# Patient Record
Sex: FEMALE | Race: WHITE | NOT HISPANIC OR LATINO | URBAN - METROPOLITAN AREA
[De-identification: names, ages, dates, MRNs, and addresses within clinical notes are randomized per-mention and may not be internally consistent; named-entity substitution may affect disease eponyms.]

---

## 2017-03-13 ENCOUNTER — EMERGENCY (EMERGENCY)
Facility: HOSPITAL | Age: 72
LOS: 1 days | End: 2017-03-13
Admitting: EMERGENCY MEDICINE
Payer: MEDICARE

## 2017-03-13 DIAGNOSIS — Z98.89 OTHER SPECIFIED POSTPROCEDURAL STATES: Chronic | ICD-10-CM

## 2017-03-13 PROCEDURE — 71100 X-RAY EXAM RIBS UNI 2 VIEWS: CPT

## 2017-03-13 PROCEDURE — 72170 X-RAY EXAM OF PELVIS: CPT

## 2017-03-13 PROCEDURE — 99283 EMERGENCY DEPT VISIT LOW MDM: CPT

## 2017-03-13 PROCEDURE — 99284 EMERGENCY DEPT VISIT MOD MDM: CPT | Mod: 25

## 2017-03-13 PROCEDURE — 71020: CPT | Mod: 26

## 2017-03-13 PROCEDURE — 71100 X-RAY EXAM RIBS UNI 2 VIEWS: CPT | Mod: 26

## 2017-03-13 PROCEDURE — 72170 X-RAY EXAM OF PELVIS: CPT | Mod: 26

## 2017-03-13 PROCEDURE — 71046 X-RAY EXAM CHEST 2 VIEWS: CPT

## 2017-03-27 ENCOUNTER — APPOINTMENT (OUTPATIENT)
Dept: INTERNAL MEDICINE | Facility: CLINIC | Age: 72
End: 2017-03-27

## 2017-03-27 VITALS
SYSTOLIC BLOOD PRESSURE: 110 MMHG | TEMPERATURE: 98 F | BODY MASS INDEX: 24.48 KG/M2 | RESPIRATION RATE: 13 BRPM | HEIGHT: 62 IN | WEIGHT: 133 LBS | HEART RATE: 72 BPM | OXYGEN SATURATION: 98 % | DIASTOLIC BLOOD PRESSURE: 60 MMHG

## 2017-03-27 DIAGNOSIS — R07.81 PLEURODYNIA: ICD-10-CM

## 2017-04-24 ENCOUNTER — APPOINTMENT (OUTPATIENT)
Dept: INTERNAL MEDICINE | Facility: CLINIC | Age: 72
End: 2017-04-24

## 2017-04-24 VITALS
DIASTOLIC BLOOD PRESSURE: 58 MMHG | HEART RATE: 75 BPM | SYSTOLIC BLOOD PRESSURE: 98 MMHG | WEIGHT: 133 LBS | HEIGHT: 62 IN | RESPIRATION RATE: 13 BRPM | OXYGEN SATURATION: 98 % | BODY MASS INDEX: 24.48 KG/M2

## 2017-04-24 DIAGNOSIS — M54.9 DORSALGIA, UNSPECIFIED: ICD-10-CM

## 2017-05-08 ENCOUNTER — APPOINTMENT (OUTPATIENT)
Dept: ORTHOPEDIC SURGERY | Facility: CLINIC | Age: 72
End: 2017-05-08

## 2017-05-08 VITALS
DIASTOLIC BLOOD PRESSURE: 73 MMHG | SYSTOLIC BLOOD PRESSURE: 125 MMHG | BODY MASS INDEX: 24.48 KG/M2 | WEIGHT: 133 LBS | HEIGHT: 62 IN

## 2017-05-08 DIAGNOSIS — M41.9 SCOLIOSIS, UNSPECIFIED: ICD-10-CM

## 2017-05-08 DIAGNOSIS — M26.629 ARTHRALGIA OF TEMPOROMANDIBULAR JOINT,: ICD-10-CM

## 2017-05-08 DIAGNOSIS — M85.80 OTHER SPECIFIED DISORDERS OF BONE DENSITY AND STRUCTURE, UNSPECIFIED SITE: ICD-10-CM

## 2017-05-08 DIAGNOSIS — E55.9 VITAMIN D DEFICIENCY, UNSPECIFIED: ICD-10-CM

## 2017-06-12 ENCOUNTER — APPOINTMENT (OUTPATIENT)
Dept: ORTHOPEDIC SURGERY | Facility: CLINIC | Age: 72
End: 2017-06-12

## 2018-07-03 ENCOUNTER — APPOINTMENT (OUTPATIENT)
Dept: INTERNAL MEDICINE | Facility: CLINIC | Age: 73
End: 2018-07-03
Payer: MEDICARE

## 2018-07-03 VITALS
HEIGHT: 62 IN | TEMPERATURE: 98.1 F | DIASTOLIC BLOOD PRESSURE: 68 MMHG | OXYGEN SATURATION: 97 % | SYSTOLIC BLOOD PRESSURE: 122 MMHG | HEART RATE: 82 BPM | RESPIRATION RATE: 14 BRPM | WEIGHT: 131 LBS | BODY MASS INDEX: 24.11 KG/M2

## 2018-07-03 DIAGNOSIS — N64.4 MASTODYNIA: ICD-10-CM

## 2018-07-03 PROCEDURE — 99214 OFFICE O/P EST MOD 30 MIN: CPT

## 2018-07-03 NOTE — HISTORY OF PRESENT ILLNESS
[FreeTextEntry8] : pt here today with right breast and chest wall tender nous.\par Pt tendentious in the chest wall muscle.\par Last mammogram 11/17\par No SOB,\par No Palpitations.

## 2018-07-03 NOTE — ASSESSMENT
[FreeTextEntry1] : Most likely costochondritis\par Naprosyn 500mg bid with food.\par Return if worse.

## 2018-07-03 NOTE — PHYSICAL EXAM

## 2018-12-11 ENCOUNTER — TRANSCRIPTION ENCOUNTER (OUTPATIENT)
Age: 73
End: 2018-12-11

## 2018-12-12 ENCOUNTER — OUTPATIENT (OUTPATIENT)
Dept: OUTPATIENT SERVICES | Facility: HOSPITAL | Age: 73
LOS: 1 days | End: 2018-12-12
Payer: MEDICARE

## 2018-12-12 VITALS
SYSTOLIC BLOOD PRESSURE: 109 MMHG | RESPIRATION RATE: 13 BRPM | OXYGEN SATURATION: 97 % | HEART RATE: 67 BPM | DIASTOLIC BLOOD PRESSURE: 59 MMHG

## 2018-12-12 VITALS
RESPIRATION RATE: 15 BRPM | DIASTOLIC BLOOD PRESSURE: 59 MMHG | TEMPERATURE: 99 F | WEIGHT: 133.6 LBS | HEART RATE: 60 BPM | OXYGEN SATURATION: 96 % | SYSTOLIC BLOOD PRESSURE: 109 MMHG | HEIGHT: 61.25 IN

## 2018-12-12 DIAGNOSIS — Z98.89 OTHER SPECIFIED POSTPROCEDURAL STATES: Chronic | ICD-10-CM

## 2018-12-12 DIAGNOSIS — Z98.41 CATARACT EXTRACTION STATUS, RIGHT EYE: Chronic | ICD-10-CM

## 2018-12-12 DIAGNOSIS — H25.12 AGE-RELATED NUCLEAR CATARACT, LEFT EYE: ICD-10-CM

## 2018-12-12 PROCEDURE — V2632: CPT

## 2018-12-12 PROCEDURE — 66984 XCAPSL CTRC RMVL W/O ECP: CPT | Mod: LT

## 2018-12-12 PROCEDURE — 65820 GONIOTOMY: CPT | Mod: LT

## 2018-12-12 NOTE — ASU PATIENT PROFILE, ADULT - PSH
H/O colonoscopy    History of Laminectomy H/O colonoscopy    History of Laminectomy    S/P cataract surgery, right

## 2018-12-12 NOTE — ASU DISCHARGE PLAN (ADULT/PEDIATRIC). - NOTIFY
Persistent Nausea and Vomiting/Swelling that continues/Pain not relieved by Medications/Bleeding that does not stop/Fever greater than 101

## 2019-06-30 ENCOUNTER — EMERGENCY (EMERGENCY)
Facility: HOSPITAL | Age: 74
LOS: 1 days | Discharge: HOME | End: 2019-06-30
Attending: EMERGENCY MEDICINE | Admitting: EMERGENCY MEDICINE
Payer: MEDICARE

## 2019-06-30 VITALS
HEART RATE: 69 BPM | DIASTOLIC BLOOD PRESSURE: 58 MMHG | TEMPERATURE: 98 F | RESPIRATION RATE: 16 BRPM | SYSTOLIC BLOOD PRESSURE: 129 MMHG | HEIGHT: 60 IN | WEIGHT: 130.07 LBS | OXYGEN SATURATION: 97 %

## 2019-06-30 DIAGNOSIS — Z98.89 OTHER SPECIFIED POSTPROCEDURAL STATES: Chronic | ICD-10-CM

## 2019-06-30 DIAGNOSIS — Z98.41 CATARACT EXTRACTION STATUS, RIGHT EYE: Chronic | ICD-10-CM

## 2019-06-30 PROCEDURE — 99282 EMERGENCY DEPT VISIT SF MDM: CPT

## 2019-06-30 NOTE — ED PROVIDER NOTE - OBJECTIVE STATEMENT
74 yo white female had surgery for removal of right thumb cyst few weeks ago, sutures were removed few days ago and patient now presents for some blood was noted to come from surgical site after showering this morning. No fever or chills.

## 2019-06-30 NOTE — ED PROVIDER NOTE - NSFOLLOWUPINSTRUCTIONS_ED_ALL_ED_FT
Keep wound clean and dry  Wear splint  Follow-up with your hand doctor in the morning  Return here if needed

## 2019-06-30 NOTE — ED PROVIDER NOTE - CARE PROVIDER_API CALL
Rudy Nelson (MD)  Orthopaedic Surgery  [address] [place] [address]  Phone: [phone]  Fax: [phone]  Follow Up Time:

## 2019-07-08 NOTE — ASU PREOP CHECKLIST - WEIGHT IN KG
Component      Latest Ref Rng & Units 6/19/2019 6/21/2019   Fasting Status      hrs 15.5    Sodium      135 - 145 mmol/L 143    Potassium      3.4 - 5.1 mmol/L 4.3    Chloride      98 - 107 mmol/L 110 (H)    CO2      21 - 32 mmol/L 27    ANION GAP      10 - 20 mmol/L 10    Glucose      65 - 99 mg/dL 101 (H)    BUN      6 - 20 mg/dL 19    Creatinine      0.51 - 0.95 mg/dL 0.73    GFR Estimate,        >90    GFR Estimate, Non        85    BUN/CREATININE RATIO      7 - 25 26 (H)    CALCIUM      8.4 - 10.2 mg/dL 9.3    TOTAL BILIRUBIN      0.2 - 1.0 mg/dL 0.9    AST/SGOT      <38 Units/L 34    ALT/SGPT      <64 Units/L 34    ALK PHOSPHATASE      45 - 117 Units/L 78    TOTAL PROTEIN      6.4 - 8.2 g/dL 8.0    Albumin      3.6 - 5.1 g/dL 4.2    GLOBULIN      2.0 - 4.0 g/dL 3.8    A/G Ratio, Serum      1.0 - 2.4 1.1    Iodide        137 (H)     Writer will review message and labs with .    60.6

## 2020-09-09 ENCOUNTER — NON-APPOINTMENT (OUTPATIENT)
Age: 75
End: 2020-09-09

## 2020-09-09 ENCOUNTER — OUTPATIENT (OUTPATIENT)
Dept: OUTPATIENT SERVICES | Facility: HOSPITAL | Age: 75
LOS: 1 days | End: 2020-09-09
Payer: MEDICARE

## 2020-09-09 VITALS
DIASTOLIC BLOOD PRESSURE: 60 MMHG | HEIGHT: 60 IN | TEMPERATURE: 98 F | OXYGEN SATURATION: 97 % | WEIGHT: 136.03 LBS | RESPIRATION RATE: 16 BRPM | SYSTOLIC BLOOD PRESSURE: 124 MMHG | HEART RATE: 63 BPM

## 2020-09-09 DIAGNOSIS — Z98.41 CATARACT EXTRACTION STATUS, RIGHT EYE: Chronic | ICD-10-CM

## 2020-09-09 DIAGNOSIS — S53.32XA TRAUMATIC RUPTURE OF LEFT ULNAR COLLATERAL LIGAMENT, INITIAL ENCOUNTER: ICD-10-CM

## 2020-09-09 DIAGNOSIS — Z98.890 OTHER SPECIFIED POSTPROCEDURAL STATES: Chronic | ICD-10-CM

## 2020-09-09 DIAGNOSIS — Z98.89 OTHER SPECIFIED POSTPROCEDURAL STATES: Chronic | ICD-10-CM

## 2020-09-09 DIAGNOSIS — Z98.49 CATARACT EXTRACTION STATUS, UNSPECIFIED EYE: Chronic | ICD-10-CM

## 2020-09-09 DIAGNOSIS — S53.31XA TRAUMATIC RUPTURE OF RIGHT ULNAR COLLATERAL LIGAMENT, INITIAL ENCOUNTER: ICD-10-CM

## 2020-09-09 DIAGNOSIS — Z01.818 ENCOUNTER FOR OTHER PREPROCEDURAL EXAMINATION: ICD-10-CM

## 2020-09-09 LAB
ANION GAP SERPL CALC-SCNC: 5 MMOL/L — SIGNIFICANT CHANGE UP (ref 5–17)
BUN SERPL-MCNC: 17 MG/DL — SIGNIFICANT CHANGE UP (ref 7–23)
CALCIUM SERPL-MCNC: 8.8 MG/DL — SIGNIFICANT CHANGE UP (ref 8.5–10.1)
CHLORIDE SERPL-SCNC: 108 MMOL/L — SIGNIFICANT CHANGE UP (ref 96–108)
CO2 SERPL-SCNC: 29 MMOL/L — SIGNIFICANT CHANGE UP (ref 22–31)
CREAT SERPL-MCNC: 0.7 MG/DL — SIGNIFICANT CHANGE UP (ref 0.5–1.3)
GLUCOSE SERPL-MCNC: 104 MG/DL — HIGH (ref 70–99)
HCT VFR BLD CALC: 38.9 % — SIGNIFICANT CHANGE UP (ref 34.5–45)
HGB BLD-MCNC: 12.8 G/DL — SIGNIFICANT CHANGE UP (ref 11.5–15.5)
MCHC RBC-ENTMCNC: 28.1 PG — SIGNIFICANT CHANGE UP (ref 27–34)
MCHC RBC-ENTMCNC: 32.9 GM/DL — SIGNIFICANT CHANGE UP (ref 32–36)
MCV RBC AUTO: 85.5 FL — SIGNIFICANT CHANGE UP (ref 80–100)
NRBC # BLD: 0 /100 WBCS — SIGNIFICANT CHANGE UP (ref 0–0)
PLATELET # BLD AUTO: 221 K/UL — SIGNIFICANT CHANGE UP (ref 150–400)
POTASSIUM SERPL-MCNC: 4 MMOL/L — SIGNIFICANT CHANGE UP (ref 3.5–5.3)
POTASSIUM SERPL-SCNC: 4 MMOL/L — SIGNIFICANT CHANGE UP (ref 3.5–5.3)
RBC # BLD: 4.55 M/UL — SIGNIFICANT CHANGE UP (ref 3.8–5.2)
RBC # FLD: 13.1 % — SIGNIFICANT CHANGE UP (ref 10.3–14.5)
SODIUM SERPL-SCNC: 142 MMOL/L — SIGNIFICANT CHANGE UP (ref 135–145)
WBC # BLD: 4.68 K/UL — SIGNIFICANT CHANGE UP (ref 3.8–10.5)
WBC # FLD AUTO: 4.68 K/UL — SIGNIFICANT CHANGE UP (ref 3.8–10.5)

## 2020-09-09 PROCEDURE — 80048 BASIC METABOLIC PNL TOTAL CA: CPT

## 2020-09-09 PROCEDURE — 93005 ELECTROCARDIOGRAM TRACING: CPT

## 2020-09-09 PROCEDURE — 85027 COMPLETE CBC AUTOMATED: CPT

## 2020-09-09 PROCEDURE — 93010 ELECTROCARDIOGRAM REPORT: CPT

## 2020-09-09 PROCEDURE — G0463: CPT

## 2020-09-09 PROCEDURE — 36415 COLL VENOUS BLD VENIPUNCTURE: CPT

## 2020-09-09 NOTE — H&P PST ADULT - NSICDXPROBLEM_GEN_ALL_CORE_FT
PROBLEM DIAGNOSES  Problem: Traumatic rupture of right ulnar collateral ligament  Assessment and Plan:  scheduled for repair  of right thumb ulnar collateral ligament rupture utilizing mini fluro.  Covid testing within 3 days prior to surgery. Information given.

## 2020-09-09 NOTE — H&P PST ADULT - HISTORY OF PRESENT ILLNESS
75 y/o healthy female PMH of glaucoma, denies any other PMH, with c/o of tripping on the sidewalk while walking about 2 weeks ago.  was seen by Dr Nelson, Xray and MRI was done, now scheduled for repair  of right thumb ulnar collateral ligament rupture utilizing mini fluro. Pre op testing today.

## 2020-09-09 NOTE — H&P PST ADULT - ASSESSMENT
scheduled for repair  of right thumb ulnar collateral ligament rupture utilizing mini fluro. Pre op testing today.  Medical eval advised.  Pre op instructions:  Hold OTC supplements. Medications reviewed for the week and morning of surgery.  NPO after 11pm to the morning of surgery.  Shower  the morning of surgery with antibacterial soap as instructed.  Covid testing within 3 days prior to surgery. Information given.  Patient verbalized understanding. scheduled for repair  of right thumb ulnar collateral ligament rupture utilizing mini fluro. Pre op testing today.  Medical eval advised. has no PCP at this time, spoke to sulema in Dr Nelson's office, she said  will recommend a doctor for medical eval.   Pre op instructions:  Hold OTC supplements. Medications reviewed for the week and morning of surgery.  NPO after 11pm to the morning of surgery.  Shower  the morning of surgery with antibacterial soap as instructed.  Covid testing within 3 days prior to surgery. Information given.  Patient verbalized understanding.

## 2020-09-09 NOTE — H&P PST ADULT - NSANTHOSAYNRD_GEN_A_CORE
No. SANDOVAL screening performed.  STOP BANG Legend: 0-2 = LOW Risk; 3-4 = INTERMEDIATE Risk; 5-8 = HIGH Risk

## 2020-09-09 NOTE — H&P PST ADULT - NSICDXPASTSURGICALHX_GEN_ALL_CORE_FT
PAST SURGICAL HISTORY:  H/O colonoscopy     H/O hand surgery left wrist fracture 2018    History of Laminectomy cervical c4    S/P cataract surgery bilateral 2017

## 2020-09-13 ENCOUNTER — OUTPATIENT (OUTPATIENT)
Dept: OUTPATIENT SERVICES | Facility: HOSPITAL | Age: 75
LOS: 1 days | End: 2020-09-13
Payer: MEDICARE

## 2020-09-13 DIAGNOSIS — Z11.59 ENCOUNTER FOR SCREENING FOR OTHER VIRAL DISEASES: ICD-10-CM

## 2020-09-13 DIAGNOSIS — Z98.89 OTHER SPECIFIED POSTPROCEDURAL STATES: Chronic | ICD-10-CM

## 2020-09-13 DIAGNOSIS — Z98.49 CATARACT EXTRACTION STATUS, UNSPECIFIED EYE: Chronic | ICD-10-CM

## 2020-09-13 DIAGNOSIS — Z98.890 OTHER SPECIFIED POSTPROCEDURAL STATES: Chronic | ICD-10-CM

## 2020-09-13 LAB — SARS-COV-2 RNA SPEC QL NAA+PROBE: SIGNIFICANT CHANGE UP

## 2020-09-13 PROCEDURE — U0003: CPT

## 2020-09-14 ENCOUNTER — APPOINTMENT (OUTPATIENT)
Dept: INTERNAL MEDICINE | Facility: CLINIC | Age: 75
End: 2020-09-14
Payer: MEDICARE

## 2020-09-14 ENCOUNTER — TRANSCRIPTION ENCOUNTER (OUTPATIENT)
Age: 75
End: 2020-09-14

## 2020-09-14 VITALS
RESPIRATION RATE: 14 BRPM | SYSTOLIC BLOOD PRESSURE: 130 MMHG | OXYGEN SATURATION: 97 % | DIASTOLIC BLOOD PRESSURE: 64 MMHG | WEIGHT: 144 LBS | BODY MASS INDEX: 26.34 KG/M2 | HEART RATE: 70 BPM | TEMPERATURE: 97.4 F

## 2020-09-14 DIAGNOSIS — S53.31XA TRAUMATIC RUPTURE OF RIGHT ULNAR COLLATERAL LIGAMENT, INITIAL ENCOUNTER: ICD-10-CM

## 2020-09-14 DIAGNOSIS — S63.649A SPRAIN OF METACARPOPHALANGEAL JOINT OF UNSPECIFIED THUMB, INITIAL ENCOUNTER: ICD-10-CM

## 2020-09-14 DIAGNOSIS — Z01.818 ENCOUNTER FOR OTHER PREPROCEDURAL EXAMINATION: ICD-10-CM

## 2020-09-14 PROCEDURE — 99214 OFFICE O/P EST MOD 30 MIN: CPT

## 2020-09-14 RX ORDER — CYCLOBENZAPRINE HYDROCHLORIDE 5 MG/1
5 TABLET, FILM COATED ORAL 3 TIMES DAILY
Qty: 30 | Refills: 0 | Status: DISCONTINUED | COMMUNITY
Start: 2017-03-27 | End: 2020-09-14

## 2020-09-14 NOTE — ASSESSMENT
[Patient Optimized for Surgery] : Patient optimized for surgery [No Further Testing Recommended] : no further testing recommended [FreeTextEntry4] : EKG NSR\par Presurgical labs are within acceptable limits\par There are no medical contraindications to proceeding with the planned surgery.

## 2020-09-14 NOTE — HISTORY OF PRESENT ILLNESS
[No Pertinent Cardiac History] : no history of aortic stenosis, atrial fibrillation, coronary artery disease, recent myocardial infarction, or implantable device/pacemaker [No Pertinent Pulmonary History] : no history of asthma, COPD, sleep apnea, or smoking [No Adverse Anesthesia Reaction] : no adverse anesthesia reaction in self or family member [Chronic Anticoagulation] : no chronic anticoagulation [Diabetes] : no diabetes [Chronic Kidney Disease] : no chronic kidney disease [FreeTextEntry1] : Right wrist surgery [FreeTextEntry2] : 9/15/2020 [FreeTextEntry3] : Dr. Nelson [FreeTextEntry4] : PARKERADELITA CHIDI HUMPHREYS, [birth year], is here for presurgical evaluation.\par  Pt ruptured her left ulnar collateral ligament about 2 weeks ago.\par Pt otherwise feels fine.\par Pt denies chest pain, dyspnea, lightheadedness, palpitations, fever, chills, or sweats

## 2020-09-14 NOTE — ASU PATIENT PROFILE, ADULT - PSH
H/O colonoscopy    H/O hand surgery  left wrist fracture 2018  History of Laminectomy  cervical c4  S/P cataract surgery  bilateral 2017

## 2020-09-14 NOTE — PHYSICAL EXAM
[No Acute Distress] : no acute distress [Well Nourished] : well nourished [Normal Sclera/Conjunctiva] : normal sclera/conjunctiva [Well Developed] : well developed [Well-Appearing] : well-appearing [EOMI] : extraocular movements intact [PERRL] : pupils equal round and reactive to light [No JVD] : no jugular venous distention [Normal Outer Ear/Nose] : the outer ears and nose were normal in appearance [Normal Oropharynx] : the oropharynx was normal [Supple] : supple [No Lymphadenopathy] : no lymphadenopathy [Thyroid Normal, No Nodules] : the thyroid was normal and there were no nodules present [No Respiratory Distress] : no respiratory distress  [Normal Rate] : normal rate  [Clear to Auscultation] : lungs were clear to auscultation bilaterally [No Accessory Muscle Use] : no accessory muscle use [Normal S1, S2] : normal S1 and S2 [Regular Rhythm] : with a regular rhythm [No Murmur] : no murmur heard [No Abdominal Bruit] : a ~M bruit was not heard ~T in the abdomen [No Carotid Bruits] : no carotid bruits [No Varicosities] : no varicosities [Pedal Pulses Present] : the pedal pulses are present [No Edema] : there was no peripheral edema [No Palpable Aorta] : no palpable aorta [No Extremity Clubbing/Cyanosis] : no extremity clubbing/cyanosis [Soft] : abdomen soft [Non-distended] : non-distended [Non Tender] : non-tender [No HSM] : no HSM [No Masses] : no abdominal mass palpated [Normal Posterior Cervical Nodes] : no posterior cervical lymphadenopathy [Normal Bowel Sounds] : normal bowel sounds [No Spinal Tenderness] : no spinal tenderness [Normal Anterior Cervical Nodes] : no anterior cervical lymphadenopathy [No CVA Tenderness] : no CVA  tenderness [No Joint Swelling] : no joint swelling [Grossly Normal Strength/Tone] : grossly normal strength/tone [Coordination Grossly Intact] : coordination grossly intact [No Rash] : no rash [Normal Gait] : normal gait [No Focal Deficits] : no focal deficits [Normal Affect] : the affect was normal [Normal Insight/Judgement] : insight and judgment were intact

## 2020-09-15 ENCOUNTER — OUTPATIENT (OUTPATIENT)
Dept: OUTPATIENT SERVICES | Facility: HOSPITAL | Age: 75
LOS: 1 days | End: 2020-09-15
Payer: MEDICARE

## 2020-09-15 ENCOUNTER — RESULT REVIEW (OUTPATIENT)
Age: 75
End: 2020-09-15

## 2020-09-15 VITALS
OXYGEN SATURATION: 97 % | DIASTOLIC BLOOD PRESSURE: 62 MMHG | SYSTOLIC BLOOD PRESSURE: 146 MMHG | TEMPERATURE: 98 F | HEART RATE: 74 BPM | RESPIRATION RATE: 14 BRPM

## 2020-09-15 VITALS
RESPIRATION RATE: 14 BRPM | HEIGHT: 60 IN | SYSTOLIC BLOOD PRESSURE: 132 MMHG | HEART RATE: 67 BPM | WEIGHT: 136.03 LBS | DIASTOLIC BLOOD PRESSURE: 46 MMHG | OXYGEN SATURATION: 98 % | TEMPERATURE: 98 F

## 2020-09-15 DIAGNOSIS — Z98.89 OTHER SPECIFIED POSTPROCEDURAL STATES: Chronic | ICD-10-CM

## 2020-09-15 DIAGNOSIS — Z98.49 CATARACT EXTRACTION STATUS, UNSPECIFIED EYE: Chronic | ICD-10-CM

## 2020-09-15 DIAGNOSIS — S53.32XA TRAUMATIC RUPTURE OF LEFT ULNAR COLLATERAL LIGAMENT, INITIAL ENCOUNTER: ICD-10-CM

## 2020-09-15 DIAGNOSIS — S53.31XA TRAUMATIC RUPTURE OF RIGHT ULNAR COLLATERAL LIGAMENT, INITIAL ENCOUNTER: ICD-10-CM

## 2020-09-15 DIAGNOSIS — Z98.890 OTHER SPECIFIED POSTPROCEDURAL STATES: Chronic | ICD-10-CM

## 2020-09-15 PROCEDURE — C1713: CPT

## 2020-09-15 PROCEDURE — 76000 FLUOROSCOPY <1 HR PHYS/QHP: CPT

## 2020-09-15 PROCEDURE — 26540 REPAIR HAND JOINT: CPT | Mod: F5

## 2020-09-15 RX ORDER — ACETAMINOPHEN 500 MG
1000 TABLET ORAL ONCE
Refills: 0 | Status: COMPLETED | OUTPATIENT
Start: 2020-09-15 | End: 2020-09-15

## 2020-09-15 RX ORDER — SODIUM CHLORIDE 9 MG/ML
1000 INJECTION, SOLUTION INTRAVENOUS
Refills: 0 | Status: DISCONTINUED | OUTPATIENT
Start: 2020-09-15 | End: 2020-09-15

## 2020-09-15 RX ORDER — ONDANSETRON 8 MG/1
4 TABLET, FILM COATED ORAL ONCE
Refills: 0 | Status: COMPLETED | OUTPATIENT
Start: 2020-09-15 | End: 2020-09-15

## 2020-09-15 RX ORDER — CEFAZOLIN SODIUM 1 G
2000 VIAL (EA) INJECTION ONCE
Refills: 0 | Status: COMPLETED | OUTPATIENT
Start: 2020-09-15 | End: 2020-09-15

## 2020-09-15 RX ORDER — HYDROMORPHONE HYDROCHLORIDE 2 MG/ML
0.5 INJECTION INTRAMUSCULAR; INTRAVENOUS; SUBCUTANEOUS ONCE
Refills: 0 | Status: DISCONTINUED | OUTPATIENT
Start: 2020-09-15 | End: 2020-09-15

## 2020-09-15 RX ADMIN — SODIUM CHLORIDE 50 MILLILITER(S): 9 INJECTION, SOLUTION INTRAVENOUS at 10:42

## 2020-09-15 RX ADMIN — Medication 1000 MILLIGRAM(S): at 14:30

## 2020-09-15 RX ADMIN — Medication 400 MILLIGRAM(S): at 14:14

## 2020-09-15 RX ADMIN — SODIUM CHLORIDE 75 MILLILITER(S): 9 INJECTION, SOLUTION INTRAVENOUS at 14:15

## 2020-09-15 RX ADMIN — ONDANSETRON 4 MILLIGRAM(S): 8 TABLET, FILM COATED ORAL at 15:26

## 2020-09-15 NOTE — ASU DISCHARGE PLAN (ADULT/PEDIATRIC) - CARE PROVIDER_API CALL
Rudy Nelson)  Orthopaedic Surgery  [address] [place] [address]  Phone: [phone]  Fax: [phone]  Scheduled Appointment: 09/25/2020

## 2020-09-15 NOTE — ASU DISCHARGE PLAN (ADULT/PEDIATRIC) - CALL YOUR DOCTOR IF YOU HAVE ANY OF THE FOLLOWING:
Numbness, tingling, color or temperature change to extremity/Swelling that gets worse/Pain not relieved by Medications

## 2021-01-10 ENCOUNTER — FORM ENCOUNTER (OUTPATIENT)
Age: 76
End: 2021-01-10

## 2021-01-11 ENCOUNTER — TRANSCRIPTION ENCOUNTER (OUTPATIENT)
Age: 76
End: 2021-01-11

## 2021-01-14 ENCOUNTER — APPOINTMENT (OUTPATIENT)
Dept: DISASTER EMERGENCY | Facility: HOSPITAL | Age: 76
End: 2021-01-14

## 2021-01-14 ENCOUNTER — TRANSCRIPTION ENCOUNTER (OUTPATIENT)
Age: 76
End: 2021-01-14

## 2021-01-14 ENCOUNTER — OUTPATIENT (OUTPATIENT)
Dept: OUTPATIENT SERVICES | Facility: HOSPITAL | Age: 76
LOS: 1 days | End: 2021-01-14
Payer: MEDICARE

## 2021-01-14 VITALS
HEART RATE: 71 BPM | RESPIRATION RATE: 17 BRPM | SYSTOLIC BLOOD PRESSURE: 120 MMHG | TEMPERATURE: 98 F | OXYGEN SATURATION: 100 % | DIASTOLIC BLOOD PRESSURE: 69 MMHG

## 2021-01-14 VITALS
RESPIRATION RATE: 18 BRPM | SYSTOLIC BLOOD PRESSURE: 119 MMHG | HEART RATE: 72 BPM | OXYGEN SATURATION: 97 % | TEMPERATURE: 98 F | DIASTOLIC BLOOD PRESSURE: 73 MMHG

## 2021-01-14 DIAGNOSIS — Z98.890 OTHER SPECIFIED POSTPROCEDURAL STATES: Chronic | ICD-10-CM

## 2021-01-14 DIAGNOSIS — Z98.49 CATARACT EXTRACTION STATUS, UNSPECIFIED EYE: Chronic | ICD-10-CM

## 2021-01-14 DIAGNOSIS — Z98.89 OTHER SPECIFIED POSTPROCEDURAL STATES: Chronic | ICD-10-CM

## 2021-01-14 DIAGNOSIS — U07.1 COVID-19: ICD-10-CM

## 2021-01-14 PROCEDURE — M0243: CPT

## 2021-01-14 NOTE — CHART NOTE - NSCHARTNOTEFT_GEN_A_CORE
CC: Monoclonal Antibody Infusion/COVID 19 Positive  75yFemale with no significant PMHx and symptoms of malaise, body aches     exam/findings:  T(C): 36.8 (01-14-21 @ 10:50), Max: 36.8 (01-14-21 @ 10:50)  HR: 71 (01-14-21 @ 10:50) (71 - 71)  BP: 120/69 (01-14-21 @ 10:50) (120/69 - 120/69)  RR: 17 (01-14-21 @ 10:50) (17 - 17)  SpO2: 100% (01-14-21 @ 10:50) (100% - 100%)      PE:   Appearance: NAD	  HEENT:   Normal oral mucosa,   Lymphatic: No lymphadenopathy  Cardiovascular: Normal S1 S2, No JVD, No murmurs, No edema  Respiratory: Lungs clear to auscultation	  Gastrointestinal:  Soft, Non-tender, + BS	  Skin: warm and dry  Neurologic: Non-focal  Extremities: Normal range of motion, no calf tenderness or edema    ASSESSMENT:  Pt is a 75y with no significant PMHx, Covid 19 Positive with symptoms in the last 10 days, who presents to infusion center for Monoclonal antibody infusion (Casirivimab/Imdevimab)  Symptoms/ Criteria: malaise, cough, body aches  Risk Profile includes: age >65    PLAN:  - infusion procedure explained to patient   -Consent for monoclonal antibody infusion obtained   - Risk & benifits discussed/all questions answered  -infuse  Casirivimab/Imdevimab 1200mg/1200mg IV over one hour   -observe patient for one hour post infusion      I have reviewed the Casirivimab/Imdevimab Emergency Use Authorization (EAU) and I have provided the patient or patient's caregiver with the following information:  1. FDA has authorized emergency use of Casirivimab/Imdevimab, which is not FDA-approved biologic product.  2. The patient or patient's caregiver has the option to accept or refuse administration of Casirivimab/Imdevimab  3. The significant known and benefits are unknown.  4. Information on available alternative treatments and risks and benefits of those alternatives.    Discharge:  Patient tolerated infusion well denies complaints of chest pain/SOB/dizziness/ palps  Vital signs stable  D/C instructions given/ fact sheet included.  Patient to follow-up with PCP as needed.

## 2021-01-16 ENCOUNTER — TRANSCRIPTION ENCOUNTER (OUTPATIENT)
Age: 76
End: 2021-01-16

## 2021-04-13 ENCOUNTER — APPOINTMENT (OUTPATIENT)
Dept: DISASTER EMERGENCY | Facility: OTHER | Age: 76
End: 2021-04-13
Payer: MEDICARE

## 2021-04-13 PROCEDURE — 0012A: CPT

## 2021-08-05 ENCOUNTER — TRANSCRIPTION ENCOUNTER (OUTPATIENT)
Age: 76
End: 2021-08-05

## 2021-08-24 ENCOUNTER — TRANSCRIPTION ENCOUNTER (OUTPATIENT)
Age: 76
End: 2021-08-24

## 2021-08-25 ENCOUNTER — EMERGENCY (EMERGENCY)
Facility: HOSPITAL | Age: 76
LOS: 1 days | Discharge: HOME | End: 2021-08-25
Attending: EMERGENCY MEDICINE | Admitting: EMERGENCY MEDICINE
Payer: MEDICARE

## 2021-08-25 VITALS
OXYGEN SATURATION: 96 % | HEIGHT: 60 IN | WEIGHT: 145.06 LBS | TEMPERATURE: 99 F | SYSTOLIC BLOOD PRESSURE: 146 MMHG | RESPIRATION RATE: 18 BRPM | DIASTOLIC BLOOD PRESSURE: 76 MMHG | HEART RATE: 72 BPM

## 2021-08-25 DIAGNOSIS — Z98.890 OTHER SPECIFIED POSTPROCEDURAL STATES: Chronic | ICD-10-CM

## 2021-08-25 DIAGNOSIS — Z98.49 CATARACT EXTRACTION STATUS, UNSPECIFIED EYE: Chronic | ICD-10-CM

## 2021-08-25 DIAGNOSIS — Z98.89 OTHER SPECIFIED POSTPROCEDURAL STATES: Chronic | ICD-10-CM

## 2021-08-25 LAB
ALBUMIN SERPL ELPH-MCNC: 3.5 G/DL — SIGNIFICANT CHANGE UP (ref 3.3–5)
ALP SERPL-CCNC: 85 U/L — SIGNIFICANT CHANGE UP (ref 40–120)
ALT FLD-CCNC: 11 U/L — LOW (ref 12–78)
ANION GAP SERPL CALC-SCNC: 8 MMOL/L — SIGNIFICANT CHANGE UP (ref 5–17)
AST SERPL-CCNC: 11 U/L — LOW (ref 15–37)
BASOPHILS # BLD AUTO: 0.04 K/UL — SIGNIFICANT CHANGE UP (ref 0–0.2)
BASOPHILS NFR BLD AUTO: 0.6 % — SIGNIFICANT CHANGE UP (ref 0–2)
BILIRUB SERPL-MCNC: 0.3 MG/DL — SIGNIFICANT CHANGE UP (ref 0.2–1.2)
BUN SERPL-MCNC: 13 MG/DL — SIGNIFICANT CHANGE UP (ref 7–23)
CALCIUM SERPL-MCNC: 8.5 MG/DL — SIGNIFICANT CHANGE UP (ref 8.5–10.1)
CHLORIDE SERPL-SCNC: 106 MMOL/L — SIGNIFICANT CHANGE UP (ref 96–108)
CO2 SERPL-SCNC: 27 MMOL/L — SIGNIFICANT CHANGE UP (ref 22–31)
CREAT SERPL-MCNC: 0.56 MG/DL — SIGNIFICANT CHANGE UP (ref 0.5–1.3)
EOSINOPHIL # BLD AUTO: 0.22 K/UL — SIGNIFICANT CHANGE UP (ref 0–0.5)
EOSINOPHIL NFR BLD AUTO: 3.3 % — SIGNIFICANT CHANGE UP (ref 0–6)
GLUCOSE SERPL-MCNC: 124 MG/DL — HIGH (ref 70–99)
HCT VFR BLD CALC: 37.6 % — SIGNIFICANT CHANGE UP (ref 34.5–45)
HGB BLD-MCNC: 12.5 G/DL — SIGNIFICANT CHANGE UP (ref 11.5–15.5)
IMM GRANULOCYTES NFR BLD AUTO: 0.3 % — SIGNIFICANT CHANGE UP (ref 0–1.5)
LYMPHOCYTES # BLD AUTO: 1.84 K/UL — SIGNIFICANT CHANGE UP (ref 1–3.3)
LYMPHOCYTES # BLD AUTO: 27.6 % — SIGNIFICANT CHANGE UP (ref 13–44)
MCHC RBC-ENTMCNC: 28.1 PG — SIGNIFICANT CHANGE UP (ref 27–34)
MCHC RBC-ENTMCNC: 33.2 GM/DL — SIGNIFICANT CHANGE UP (ref 32–36)
MCV RBC AUTO: 84.5 FL — SIGNIFICANT CHANGE UP (ref 80–100)
MONOCYTES # BLD AUTO: 0.45 K/UL — SIGNIFICANT CHANGE UP (ref 0–0.9)
MONOCYTES NFR BLD AUTO: 6.7 % — SIGNIFICANT CHANGE UP (ref 2–14)
NEUTROPHILS # BLD AUTO: 4.1 K/UL — SIGNIFICANT CHANGE UP (ref 1.8–7.4)
NEUTROPHILS NFR BLD AUTO: 61.5 % — SIGNIFICANT CHANGE UP (ref 43–77)
NRBC # BLD: 0 /100 WBCS — SIGNIFICANT CHANGE UP (ref 0–0)
PLATELET # BLD AUTO: 265 K/UL — SIGNIFICANT CHANGE UP (ref 150–400)
POTASSIUM SERPL-MCNC: 4 MMOL/L — SIGNIFICANT CHANGE UP (ref 3.5–5.3)
POTASSIUM SERPL-SCNC: 4 MMOL/L — SIGNIFICANT CHANGE UP (ref 3.5–5.3)
PROT SERPL-MCNC: 7.5 G/DL — SIGNIFICANT CHANGE UP (ref 6–8.3)
RBC # BLD: 4.45 M/UL — SIGNIFICANT CHANGE UP (ref 3.8–5.2)
RBC # FLD: 12.9 % — SIGNIFICANT CHANGE UP (ref 10.3–14.5)
SODIUM SERPL-SCNC: 141 MMOL/L — SIGNIFICANT CHANGE UP (ref 135–145)
TROPONIN I SERPL-MCNC: <.015 NG/ML — SIGNIFICANT CHANGE UP (ref 0.01–0.04)
WBC # BLD: 6.67 K/UL — SIGNIFICANT CHANGE UP (ref 3.8–10.5)
WBC # FLD AUTO: 6.67 K/UL — SIGNIFICANT CHANGE UP (ref 3.8–10.5)

## 2021-08-25 PROCEDURE — 85730 THROMBOPLASTIN TIME PARTIAL: CPT

## 2021-08-25 PROCEDURE — 71045 X-RAY EXAM CHEST 1 VIEW: CPT | Mod: 26

## 2021-08-25 PROCEDURE — 99284 EMERGENCY DEPT VISIT MOD MDM: CPT | Mod: CS

## 2021-08-25 PROCEDURE — 85610 PROTHROMBIN TIME: CPT

## 2021-08-25 PROCEDURE — 0225U NFCT DS DNA&RNA 21 SARSCOV2: CPT

## 2021-08-25 PROCEDURE — 93010 ELECTROCARDIOGRAM REPORT: CPT

## 2021-08-25 PROCEDURE — 36415 COLL VENOUS BLD VENIPUNCTURE: CPT

## 2021-08-25 PROCEDURE — 85379 FIBRIN DEGRADATION QUANT: CPT

## 2021-08-25 PROCEDURE — 84484 ASSAY OF TROPONIN QUANT: CPT

## 2021-08-25 PROCEDURE — 85025 COMPLETE CBC W/AUTO DIFF WBC: CPT

## 2021-08-25 PROCEDURE — 71045 X-RAY EXAM CHEST 1 VIEW: CPT

## 2021-08-25 PROCEDURE — 80053 COMPREHEN METABOLIC PANEL: CPT

## 2021-08-25 PROCEDURE — 93005 ELECTROCARDIOGRAM TRACING: CPT

## 2021-08-25 PROCEDURE — 99283 EMERGENCY DEPT VISIT LOW MDM: CPT | Mod: 25

## 2021-08-25 NOTE — ED ADULT NURSE NOTE - OBJECTIVE STATEMENT
Pt presents to ED c/o productive cough, nasal congestion,  and SOB for about 1 week..  Pt came home from [facility] Monday and went to urgent care Tuesday tested negative for covid. Pt denies chest pain, fever, chills.

## 2021-08-25 NOTE — ED ADULT TRIAGE NOTE - NS ED TRIAGE AVPU SCALE
[facility]  Progress Note   Nursery    Patient Name:  Anabel Mobley  MRN: [id number]  Admission Date: 2018      Subjective:     Stable, no events noted overnight.    Feeding: Cow's milk formula   Infant is voiding and stooling.    Objective:     Vital Signs (Most Recent)  Temp: 97.6 °F (36.4 °C) (07/15/18 0830)  Pulse: 132 (07/15/18 0830)  Resp: 44 (07/15/18 0830)    Most Recent Weight: 3370 g (7 lb 6.9 oz) (18)  Percent Weight Change Since Birth: -2.3     Physical Exam  General Appearance: Healthy-appearing, vigorous infant, , no dysmorphic features  Head: Normocephalic, atraumatic, anterior fontanelle open soft and flat  Eyes: PERRL, red reflex present bilaterally, anicteric sclera, no discharge  Ears: Well-positioned, well-formed pinnae    Nose:  nares patent, no rhinorrhea  Throat: oropharynx clear, non-erythematous, mucous membranes moist, palate intact  Neck: Supple, symmetrical, no torticollis  Chest: Lungs clear to auscultation, respirations unlabored    Heart: Regular rate & rhythm, normal S1/S2, no murmurs, rubs, or gallops  Abdomen: positive bowel sounds, soft, non-tender, non-distended, no masses, umbilical stump clean  Pulses: Strong equal femoral and brachial pulses, brisk capillary refill  Hips: Negative Christiansen & Ortolani, gluteal creases equal  : Normal Da I female genitalia, anus patent  Musculosketal: no ihsan or dimples, no scoliosis or masses, clavicles intact  Extremities: Well-perfused, warm and dry, no cyanosis  Skin: no rashes, no jaundice  Neuro: strong cry, good symmetric tone and strength; positive nayla, root and suck  Labs:  Recent Results (from the past 24 hour(s))   Bilirubin, Total,     Collection Time: 18  7:27 PM   Result Value Ref Range    Bilirubin, Total -  2.7 0.1 - 6.0 mg/dL       Assessment and Plan:     40w4d  , doing well. Continue routine  care.    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 Strong support network.  Followed by psychiatrist.        [place] affected by maternal group B Streptococcus infection, mother treated prophylactically    Adequate treatment          Single liveborn infant delivered vaginally    Routine  care              Monalisa Kinney MD  Pediatrics  [facility]   Alert-The patient is alert, awake and responds to voice. The patient is oriented to time, place, and person. The triage nurse is able to obtain subjective information.

## 2021-08-25 NOTE — ED ADULT TRIAGE NOTE - CHIEF COMPLAINT QUOTE
Patient tested for covid negative from [place] ; productive cough and difficulty breathing; Covid vaccinated Moderna x 2

## 2021-08-26 VITALS
RESPIRATION RATE: 18 BRPM | OXYGEN SATURATION: 98 % | HEART RATE: 64 BPM | SYSTOLIC BLOOD PRESSURE: 135 MMHG | DIASTOLIC BLOOD PRESSURE: 73 MMHG | TEMPERATURE: 98 F

## 2021-08-26 LAB
APTT BLD: 35 SEC — SIGNIFICANT CHANGE UP (ref 27.5–35.5)
D DIMER BLD IA.RAPID-MCNC: <150 NG/ML DDU — SIGNIFICANT CHANGE UP
HMPV RNA SPEC QL NAA+PROBE: DETECTED
INR BLD: 1.04 RATIO — SIGNIFICANT CHANGE UP (ref 0.88–1.16)
PROTHROM AB SERPL-ACNC: 12.1 SEC — SIGNIFICANT CHANGE UP (ref 10.6–13.6)
RAPID RVP RESULT: DETECTED
SARS-COV-2 RNA SPEC QL NAA+PROBE: SIGNIFICANT CHANGE UP

## 2021-08-26 RX ORDER — AZITHROMYCIN 500 MG/1
1 TABLET, FILM COATED ORAL
Qty: 6 | Refills: 0
Start: 2021-08-26 | End: 2021-08-31

## 2021-08-26 NOTE — ED PROVIDER NOTE - OBJECTIVE STATEMENT
76 yo female hx of glaucoma, low back pain c/o cough x 1 week with shortness of breath while [place], is fully vaccinated with moderna.  NO fever/chills.  NO sick contacts.

## 2021-08-26 NOTE — ED PROVIDER NOTE - NS ED MD DISPO DISCHARGE CCDA
Patient/Caregiver provided printed discharge information.
The patient is a 71y Male complaining of weakness.

## 2021-08-26 NOTE — ED PROVIDER NOTE - PHYSICAL EXAMINATION
Gen: Alert, NAD, nontoxic  Head/eyes: NC/AT, PERRL  ENT: airway patent  Neck: supple, no tenderness/meningismus/JVD, Trachea midline  Pulm/lung: Bilateral clear BS, normal resp effort, no wheeze/stridor/retractions  CV/heart: RRR, no M/R/G, +2 dist pulses (radial, pedal DP/PT, popliteal)  GI/Abd: soft, NT/ND, +BS, no guarding/rebound tenderness  Musculoskeletal: no edema/erythema/cyanosis, FROM in all extremities, no C/T/L spine ttp  Skin: no rash, no vesicles, no petechaie, no ecchymosis, no swelling  Neuro: AAOx3, CN 2-12 intact, normal sensation, 5/5 motor strength in all extremities, normal gait, no dysmetria

## 2021-08-26 NOTE — ED PROVIDER NOTE - PATIENT PORTAL LINK FT
You can access the FollowMyHealth Patient Portal offered by [facility] by registering at the following website: [email or web address]. By joining [email or web address]’s FollowMyHealth portal, you will also be able to view your health information using other applications (apps) compatible with our system.

## 2021-08-26 NOTE — ED PROVIDER NOTE - NSFOLLOWUPINSTRUCTIONS_ED_ALL_ED_FT
1) Follow-up with your Primary Medical Doctor or referred doctor. Call today / next business day for prompt follow-up.  2) Return to Emergency room for any worsening or persistent pain, weakness, fever, or any other concerning symptoms.  3) See attached instruction sheets for additional information, including information regarding signs and symptoms to look out for, reasons to seek immediate care and other important instructions.  4) Follow-up with any specialists as discussed / noted as well.   5) Take medrol dose pack as prescribed  6) Z-pack as prescribed      WHAT YOU NEED TO KNOW:    An acute cough can last up to 3 weeks. Common causes of an acute cough include a cold, allergies, or a lung infection.     DISCHARGE INSTRUCTIONS:    Return to the emergency department if:   •You have trouble breathing or feel short of breath.      •You cough up blood, or you see blood in your mucus.       •You faint or feel weak or dizzy.       •You have chest pain when you cough or take a deep breath.       •You have new wheezing.       Contact your healthcare provider if:   •You have a fever.       •Your cough lasts longer than 4 weeks.       •Your symptoms do not improve with treatment.       •You have questions or concerns about your condition or care.       Medicines:   •Medicines may be needed to stop the cough, decrease swelling in your airways, or help open your airways. Medicine may also be given to help you cough up mucus. Ask your healthcare provider what over-the-counter medicines you can take. If you have an infection caused by bacteria, you may need antibiotics.       •Take your medicine as directed. Contact your healthcare provider if you think your medicine is not helping or if you have side effects. Tell him or her if you are allergic to any medicine. Keep a list of the medicines, vitamins, and herbs you take. Include the amounts, and when and why you take them. Bring the list or the pill bottles to follow-up visits. Carry your medicine list with you in case of an emergency.      Manage your symptoms:   •Do not smoke and stay away from others who smoke. Nicotine and other chemicals in cigarettes and cigars can cause lung damage and make your cough worse. Ask your healthcare provider for information if you currently smoke and need help to quit. E-cigarettes or smokeless tobacco still contain nicotine. Talk to your healthcare provider before you use these products.       •Drink extra liquids as directed. Liquids will help thin and loosen mucus so you can cough it up. Liquids will also help prevent dehydration. Examples of good liquids to drink include water, fruit juice, and broth. Do not drink liquids that contain caffeine. Caffeine can increase your risk for dehydration. Ask your healthcare provider how much liquid to drink each day.       •Rest as directed. Do not do activities that make your cough worse, such as exercise.       •Use a humidifier or vaporizer. Use a cool mist humidifier or a vaporizer to increase air moisture in your home. This may make it easier for you to breathe and help decrease your cough.       •Eat 2 to 5 mL of honey 2 times each day. Honey can help thin mucus and decrease your cough.       •Use cough drops or lozenges. These can help decrease throat irritation and your cough.       Follow up with your healthcare provider as directed: Write down your questions so you remember to ask them during your visits.

## 2021-08-26 NOTE — ED PROVIDER NOTE - NS ED ROS FT

## 2021-12-07 NOTE — ED PROVIDER NOTE - CHPI ED SYMPTOMS POS

## 2022-01-21 ENCOUNTER — TRANSCRIPTION ENCOUNTER (OUTPATIENT)
Age: 77
End: 2022-01-21

## 2022-05-25 ENCOUNTER — FORM ENCOUNTER (OUTPATIENT)
Age: 77
End: 2022-05-25

## 2022-05-26 ENCOUNTER — APPOINTMENT (OUTPATIENT)
Dept: ORTHOPEDIC SURGERY | Facility: CLINIC | Age: 77
End: 2022-05-26
Payer: MEDICARE

## 2022-05-26 ENCOUNTER — APPOINTMENT (OUTPATIENT)
Dept: MRI IMAGING | Facility: CLINIC | Age: 77
End: 2022-05-26
Payer: MEDICARE

## 2022-05-26 VITALS — BODY MASS INDEX: 25.76 KG/M2 | HEIGHT: 62 IN | WEIGHT: 140 LBS

## 2022-05-26 DIAGNOSIS — M48.061 SPINAL STENOSIS, LUMBAR REGION WITHOUT NEUROGENIC CLAUDICATION: ICD-10-CM

## 2022-05-26 DIAGNOSIS — M47.816 SPONDYLOSIS W/OUT MYELOPATHY OR RADICULOPATHY, LUMBAR REGION: ICD-10-CM

## 2022-05-26 DIAGNOSIS — M54.16 SPINAL STENOSIS, LUMBAR REGION WITHOUT NEUROGENIC CLAUDICATION: ICD-10-CM

## 2022-05-26 PROCEDURE — 72148 MRI LUMBAR SPINE W/O DYE: CPT | Mod: MH

## 2022-05-26 PROCEDURE — 99203 OFFICE O/P NEW LOW 30 MIN: CPT

## 2022-05-26 PROCEDURE — 72100 X-RAY EXAM L-S SPINE 2/3 VWS: CPT

## 2022-05-26 NOTE — HISTORY OF PRESENT ILLNESS
[Gradual] : gradual [Intermittent] : intermittent [Sitting] : sitting [Standing] : standing [de-identified] : 5/26/22  Initial visit for this 76 year female  c/o spontaneous onset of rt sided LBP radiating down rt foot x last 5 years duration. Constant and daily. Worse standing and walking , and arising from a sitting position. No wake up pain at night. Was seen by another ortho MD and was referred to PT w/o relief. tried Advil, Tylenol prn w/o relief. Did have LESI over 2 years ago w/o relief. Did have MRI done at [facility].\par \par IMPRESSION:  09/12/18\par Progressive multilevel degenerative disc disease with herniations and stenoses.\par \par At L4-5 there is a progressive diffuse degenerative bulging disc with central disc\par herniation degenerative productive posterior change resulting in progressive severe\par central and bilateral proximal foraminal left greater than right stenosis.\par \par At L3-4 there is a right foraminal disc herniation with progressive degenerative\par productive change right greater than left there is progressive moderate to severe central\par and severe right foraminal stenosis.\par \par At L2-3 there is a progressive diffuse degenerative bulging disc with moderate stenosis.\par \par L1-2 there is now a central disc herniation with mild to moderate stenosis.\par __________________________________________________ [] : no [FreeTextEntry1] : right foot/l-spine  [FreeTextEntry5] : patient is having pain in her lower back that is radiating into the right foot. She feels pain and sometimes has swelling  [FreeTextEntry7] : down into the right foot  [de-identified] : getting up from being seated

## 2022-05-26 NOTE — PHYSICAL EXAM
[Normal DTR UE/LE] : normal DTR UE/LE  [Normal Mood and Affect] : normal mood and affect [Orientated] : orientated [Able to Communicate] : able to communicate [Well Developed] : well developed [Well Nourished] : well nourished [Extension] : extension [Bending to left] : bending to left [Facet arthropathy] : Facet arthropathy [Disc space narrowing] : Disc space narrowing [Scoliosis] : Scoliosis [] : negative sitting straight leg raise [FreeTextEntry1] : Moderate lt sided degen scoliosis. DDD at L1-L2 and degen facet arthritis [TWNoteComboBox7] : forward flexion 60 degrees [de-identified] : extension 20 degrees [de-identified] : left lateral rotation 20 degrees [TWNoteComboBox6] : right lateral rotation 30 degrees

## 2022-06-06 ENCOUNTER — APPOINTMENT (OUTPATIENT)
Dept: ORTHOPEDIC SURGERY | Facility: CLINIC | Age: 77
End: 2022-06-06

## 2022-06-13 ENCOUNTER — APPOINTMENT (OUTPATIENT)
Dept: PAIN MANAGEMENT | Facility: CLINIC | Age: 77
End: 2022-06-13

## 2022-06-14 ENCOUNTER — NON-APPOINTMENT (OUTPATIENT)
Age: 77
End: 2022-06-14

## 2022-06-29 ENCOUNTER — NON-APPOINTMENT (OUTPATIENT)
Age: 77
End: 2022-06-29

## 2022-07-18 ENCOUNTER — APPOINTMENT (OUTPATIENT)
Dept: PAIN MANAGEMENT | Facility: CLINIC | Age: 77
End: 2022-07-18

## 2022-07-18 VITALS — WEIGHT: 140 LBS | BODY MASS INDEX: 25.76 KG/M2 | HEIGHT: 62 IN

## 2022-07-18 PROCEDURE — 99204 OFFICE O/P NEW MOD 45 MIN: CPT

## 2022-07-18 NOTE — HISTORY OF PRESENT ILLNESS
[Lower back] : lower back [Gradual] : gradual [Sudden] : sudden [6] : 6 [5] : 5 [Burning] : burning [Shooting] : shooting [Stabbing] : stabbing [Intermittent] : intermittent [Exercising] : exercising [Retired] : Work status: retired [de-identified] : 07/18/2022 : Patient presents for initial evaluation. She complains of right sided lower back pain for the last couple of years, worse over the last couple of months. NO weakness, No numbness or tingling.  No recent PT. Taking Tylenol Extra Strength which takes the edge off. \par \par Subjective Weakness: No\par Numbness/Tingling: No\par Bladder/Bowel dysfunction: No\par Physical Therapy:No\par \par \par Attempted modalities for current pain complaint:\par See above:\par Medications: tylenol, Mobic PRN\par \par Injections: Yes- 4-5 months ago. \par \par Previous Spine Surgery: N/A\par \par Imaging:\par MRI Lumbar Spine : 5/26/22: IMPRESSION:\par Multilevel lower thoracic and lumbar degenerative disc disease.\par Mild central stenosis at L3-4 as well as compression of the right L3 nerve root in the neural foramen.\par Moderate to severe central stenosis at L4-5.\par Disc bulges at T12-L1 and L2-3 and a small disc herniation at L1-2 without stenosis or nerve root compression.\par Modic type I endplate changes adjacent to the L1-2 disc.\par On personal view: \par also facet arthrosis throughout.  [] : Post Surgical Visit: no [FreeTextEntry1] : L spine  [de-identified] : None

## 2022-07-18 NOTE — PHYSICAL EXAM
[] : no ecchymosis [TWNoteComboBox7] : forward flexion 45 degrees [de-identified] : extension 10 degrees

## 2022-07-18 NOTE — ASSESSMENT
[FreeTextEntry1] : After discussing various treatment options with the patient including but not limited to oral medications, physical therapy, exercise, modalities as well as interventional spinal injections, we have decided with the following plan:\par \par 1) Intervention Injection Therapy:\par I personally reviewed the MRI/CT scan images and agree with the radiologist's report. The radiological findings were discussed with the patient.\par The risks, benefits, contents and alternatives to injection were explained in full to the patient. Risks outlined include but are not limited to infection,sepsis, bleeding, post-dural puncture headache, nerve damage, temporary increase in pain, syncopal episode, failure to resolve symptoms, allergic reaction, symptom recurrence, and elevation of blood sugar in diabetics. Cortisone may cause immunosuppression. Patient understands the risks. All questions were answered. After discussion of options, patient requested an injection. Information regarding the injection was given to the patient. Which medications to stop prior to the injection was explained to the patient as well.\par \par Follow up in 1-2 weeks post injection for re-evaluation. \par Continue Home exercises, stretching, activity modification, physical therapy, and conservative care.\par \par Patient is presenting with acute/sub-acute radicular pain with impairment in ADLs and functionality.  The pain has not responded to  conservative care including nsaid therapy and/or physical therapy.  There is no bleeding tendency, unstable medical condition, or systemic infection. \par \par LESI L4/5\par \par 2) if no relief with LESI consider MBB

## 2022-07-24 ENCOUNTER — NON-APPOINTMENT (OUTPATIENT)
Age: 77
End: 2022-07-24

## 2022-07-27 ENCOUNTER — APPOINTMENT (OUTPATIENT)
Dept: PAIN MANAGEMENT | Facility: CLINIC | Age: 77
End: 2022-07-27

## 2022-07-27 DIAGNOSIS — M54.16 RADICULOPATHY, LUMBAR REGION: ICD-10-CM

## 2022-07-27 PROCEDURE — 62323 NJX INTERLAMINAR LMBR/SAC: CPT

## 2022-08-10 ENCOUNTER — APPOINTMENT (OUTPATIENT)
Dept: PAIN MANAGEMENT | Facility: CLINIC | Age: 77
End: 2022-08-10

## 2022-08-10 VITALS — BODY MASS INDEX: 25.76 KG/M2 | HEIGHT: 62 IN | WEIGHT: 140 LBS

## 2022-08-10 DIAGNOSIS — M47.817 SPONDYLOSIS W/OUT MYELOPATHY OR RADICULOPATHY, LUMBOSACRAL REGION: ICD-10-CM

## 2022-08-10 PROCEDURE — 99214 OFFICE O/P EST MOD 30 MIN: CPT

## 2022-08-10 NOTE — REVIEW OF SYSTEMS
[Negative] : Heme/Lymph [Muscle Weakness] : muscle weakness [Joint Pain] : no joint pain [Joint Stiffness] : no joint stiffness [Feeling Weak] : not feeling weak [Hot Flashes] : no hot flashes [Deepening Of The Voice] : no deepening of the voice [de-identified] : Right leg weakness

## 2022-08-10 NOTE — HISTORY OF PRESENT ILLNESS
[Lower back] : lower back [Gradual] : gradual [Sudden] : sudden [Stabbing] : stabbing [Intermittent] : intermittent [Exercising] : exercising [Retired] : Work status: retired [Right Leg] : right leg [5] : 5 [0] : 0 [Tingling] : tingling [Injection therapy] : injection therapy [] : Post Surgical Visit: no [FreeTextEntry7] : Right leg [de-identified] : Excessive activity  [de-identified] : None  [de-identified] : 08/10/2022: follow up today after LESI L4/5 on 7/27/22.  patient with improvement of the lower back pain, howver most of the pain is in the right thigh. \par \par 07/18/2022 : Patient presents for initial evaluation. She complains of right sided lower back pain for the last couple of years, worse over the last couple of months. NO weakness, No numbness or tingling.  No recent PT. Taking Tylenol Extra Strength which takes the edge off. \par \par Subjective Weakness: No\par Numbness/Tingling: No\par Bladder/Bowel dysfunction: No\par Physical Therapy:No\par \par \par Attempted modalities for current pain complaint:\par See above:\par Medications: tylenol, Mobic PRN\par \par Injections: Yes- 4-5 months ago. \par 7/27/22-\par LESI L4-L5\par \par Previous Spine Surgery: N/A\par \par Imaging:\par MRI Lumbar Spine : 5/26/22: IMPRESSION:\par Multilevel lower thoracic and lumbar degenerative disc disease.\par Mild central stenosis at L3-4 as well as compression of the right L3 nerve root in the neural foramen.\par Moderate to severe central stenosis at L4-5.\par Disc bulges at T12-L1 and L2-3 and a small disc herniation at L1-2 without stenosis or nerve root compression.\par Modic type I endplate changes adjacent to the L1-2 disc.\par On personal view: \par also facet arthrosis throughout.

## 2022-08-10 NOTE — PHYSICAL EXAM
[] : no ecchymosis [TWNoteComboBox7] : forward flexion 45 degrees [de-identified] : extension 10 degrees

## 2022-09-08 ENCOUNTER — APPOINTMENT (OUTPATIENT)
Age: 77
End: 2022-09-08

## 2022-09-11 ENCOUNTER — NON-APPOINTMENT (OUTPATIENT)
Age: 77
End: 2022-09-11

## 2022-09-15 ENCOUNTER — APPOINTMENT (OUTPATIENT)
Age: 77
End: 2022-09-15

## 2022-09-15 PROCEDURE — 64483 NJX AA&/STRD TFRM EPI L/S 1: CPT | Mod: RT

## 2022-09-19 PROCEDURE — 0: CUSTOM

## 2022-12-20 ENCOUNTER — NON-APPOINTMENT (OUTPATIENT)
Age: 77
End: 2022-12-20

## 2023-04-18 ENCOUNTER — NON-APPOINTMENT (OUTPATIENT)
Age: 78
End: 2023-04-18

## 2023-04-18 ENCOUNTER — APPOINTMENT (OUTPATIENT)
Dept: INTERNAL MEDICINE | Facility: CLINIC | Age: 78
End: 2023-04-18
Payer: MEDICARE

## 2023-04-18 VITALS
RESPIRATION RATE: 14 BRPM | OXYGEN SATURATION: 96 % | DIASTOLIC BLOOD PRESSURE: 68 MMHG | WEIGHT: 134 LBS | HEIGHT: 60.5 IN | BODY MASS INDEX: 25.63 KG/M2 | SYSTOLIC BLOOD PRESSURE: 144 MMHG | HEART RATE: 66 BPM

## 2023-04-18 VITALS — SYSTOLIC BLOOD PRESSURE: 130 MMHG | DIASTOLIC BLOOD PRESSURE: 60 MMHG

## 2023-04-18 DIAGNOSIS — M51.36 OTHER INTERVERTEBRAL DISC DEGENERATION, LUMBAR REGION: ICD-10-CM

## 2023-04-18 DIAGNOSIS — Z00.00 ENCOUNTER FOR GENERAL ADULT MEDICAL EXAMINATION W/OUT ABNORMAL FINDINGS: ICD-10-CM

## 2023-04-18 PROCEDURE — G0439: CPT

## 2023-04-18 PROCEDURE — 93000 ELECTROCARDIOGRAM COMPLETE: CPT | Mod: 59

## 2023-04-19 LAB
25(OH)D3 SERPL-MCNC: 18.3 NG/ML
ALBUMIN SERPL ELPH-MCNC: 4.4 G/DL
ALP BLD-CCNC: 90 U/L
ALT SERPL-CCNC: 8 U/L
ANION GAP SERPL CALC-SCNC: 13 MMOL/L
APPEARANCE: CLEAR
AST SERPL-CCNC: 18 U/L
BASOPHILS # BLD AUTO: 0.04 K/UL
BASOPHILS NFR BLD AUTO: 0.8 %
BILIRUB SERPL-MCNC: 0.4 MG/DL
BILIRUBIN URINE: NEGATIVE
BLOOD URINE: NEGATIVE
BUN SERPL-MCNC: 13 MG/DL
CALCIUM SERPL-MCNC: 9.5 MG/DL
CHLORIDE SERPL-SCNC: 104 MMOL/L
CHOLEST SERPL-MCNC: 199 MG/DL
CO2 SERPL-SCNC: 25 MMOL/L
COLOR: NORMAL
CREAT SERPL-MCNC: 0.62 MG/DL
EGFR: 91 ML/MIN/1.73M2
EOSINOPHIL # BLD AUTO: 0.11 K/UL
EOSINOPHIL NFR BLD AUTO: 2.3 %
ESTIMATED AVERAGE GLUCOSE: 123 MG/DL
FOLATE SERPL-MCNC: 10.5 NG/ML
GLUCOSE QUALITATIVE U: NEGATIVE
GLUCOSE SERPL-MCNC: 112 MG/DL
HBA1C MFR BLD HPLC: 5.9 %
HCT VFR BLD CALC: 40.5 %
HDLC SERPL-MCNC: 56 MG/DL
HGB BLD-MCNC: 13.1 G/DL
IMM GRANULOCYTES NFR BLD AUTO: 0.2 %
KETONES URINE: NEGATIVE
LDLC SERPL CALC-MCNC: 120 MG/DL
LEUKOCYTE ESTERASE URINE: NEGATIVE
LYMPHOCYTES # BLD AUTO: 1.59 K/UL
LYMPHOCYTES NFR BLD AUTO: 33.5 %
MAN DIFF?: NORMAL
MCHC RBC-ENTMCNC: 28.6 PG
MCHC RBC-ENTMCNC: 32.3 GM/DL
MCV RBC AUTO: 88.4 FL
MONOCYTES # BLD AUTO: 0.34 K/UL
MONOCYTES NFR BLD AUTO: 7.2 %
NEUTROPHILS # BLD AUTO: 2.65 K/UL
NEUTROPHILS NFR BLD AUTO: 56 %
NITRITE URINE: NEGATIVE
NONHDLC SERPL-MCNC: 143 MG/DL
PH URINE: 6
PLATELET # BLD AUTO: 210 K/UL
POTASSIUM SERPL-SCNC: 4.5 MMOL/L
PROT SERPL-MCNC: 6.4 G/DL
PROTEIN URINE: NEGATIVE
RBC # BLD: 4.58 M/UL
RBC # FLD: 13.2 %
SODIUM SERPL-SCNC: 143 MMOL/L
SPECIFIC GRAVITY URINE: 1.02
TRIGL SERPL-MCNC: 113 MG/DL
TSH SERPL-ACNC: 2.03 UIU/ML
UROBILINOGEN URINE: NORMAL
VIT B12 SERPL-MCNC: 368 PG/ML
WBC # FLD AUTO: 4.74 K/UL

## 2023-04-19 NOTE — HISTORY OF PRESENT ILLNESS
[FreeTextEntry1] : Here for CPE\par Overall feeling well other than chronic back discomfort. Has had epidural injections with pAIN mANAGEMENT.\par Vaxed and boosted against covid [de-identified] : Never a smoker.\par No alcohol.\par Last mammogram over 1 year ago. Last GYN check-up several years ago.\par Walks for exercise

## 2023-07-07 ENCOUNTER — APPOINTMENT (OUTPATIENT)
Dept: INTERNAL MEDICINE | Facility: CLINIC | Age: 78
End: 2023-07-07
Payer: MEDICARE

## 2023-07-07 VITALS
HEIGHT: 60.5 IN | HEART RATE: 72 BPM | RESPIRATION RATE: 16 BRPM | SYSTOLIC BLOOD PRESSURE: 126 MMHG | OXYGEN SATURATION: 98 % | DIASTOLIC BLOOD PRESSURE: 74 MMHG | WEIGHT: 132 LBS | TEMPERATURE: 97.9 F | BODY MASS INDEX: 25.24 KG/M2

## 2023-07-07 DIAGNOSIS — I67.9 CEREBROVASCULAR DISEASE, UNSPECIFIED: ICD-10-CM

## 2023-07-07 DIAGNOSIS — H40.9 UNSPECIFIED GLAUCOMA: ICD-10-CM

## 2023-07-07 PROCEDURE — 99214 OFFICE O/P EST MOD 30 MIN: CPT

## 2023-07-07 NOTE — HEALTH RISK ASSESSMENT
[0] : 2) Feeling down, depressed, or hopeless: Not at all (0) [PHQ-2 Negative - No further assessment needed] : PHQ-2 Negative - No further assessment needed [XSS2Kudim] : 0 [Never] : Never

## 2023-07-07 NOTE — PHYSICAL EXAM
[No Acute Distress] : no acute distress [Well Nourished] : well nourished [Well Developed] : well developed [Well-Appearing] : well-appearing [Normal] : no acute distress, well nourished, well developed and well-appearing [No Lymphadenopathy] : no lymphadenopathy [No Respiratory Distress] : no respiratory distress  [No Accessory Muscle Use] : no accessory muscle use [Clear to Auscultation] : lungs were clear to auscultation bilaterally [Normal Rate] : normal rate  [Regular Rhythm] : with a regular rhythm [Normal S1, S2] : normal S1 and S2 [No Murmur] : no murmur heard [No Carotid Bruits] : no carotid bruits [No Edema] : there was no peripheral edema [No Extremity Clubbing/Cyanosis] : no extremity clubbing/cyanosis [Soft] : abdomen soft [Non Tender] : non-tender [Non-distended] : non-distended [No Masses] : no abdominal mass palpated [No HSM] : no HSM [Normal Bowel Sounds] : normal bowel sounds [Normal Posterior Cervical Nodes] : no posterior cervical lymphadenopathy [Normal Anterior Cervical Nodes] : no anterior cervical lymphadenopathy [Coordination Grossly Intact] : coordination grossly intact [No Focal Deficits] : no focal deficits [Normal Gait] : normal gait [Deep Tendon Reflexes (DTR)] : deep tendon reflexes were 2+ and symmetric [Normal Affect] : the affect was normal [Normal Insight/Judgement] : insight and judgment were intact

## 2023-07-07 NOTE — HISTORY OF PRESENT ILLNESS
[FreeTextEntry1] : review test done by ophthalmologist [de-identified] : Pt has glaucoma and had transcranial doppler done which showed increased cerebrovascular resistance. She was told to f/u with her PCP and/ or cardiologist. She has a cardiology appt in September. Her only medication is glaucoma drops. \par \par She also has h/o thyroid nodule which has not been checked in a long time.

## 2023-07-20 DIAGNOSIS — I65.29 OCCLUSION AND STENOSIS OF UNSPECIFIED CAROTID ARTERY: ICD-10-CM

## 2023-08-03 ENCOUNTER — NON-APPOINTMENT (OUTPATIENT)
Age: 78
End: 2023-08-03

## 2023-08-03 ENCOUNTER — APPOINTMENT (OUTPATIENT)
Dept: SURGERY | Facility: CLINIC | Age: 78
End: 2023-08-03
Payer: MEDICARE

## 2023-08-03 VITALS
SYSTOLIC BLOOD PRESSURE: 147 MMHG | BODY MASS INDEX: 25.24 KG/M2 | HEART RATE: 73 BPM | OXYGEN SATURATION: 97 % | HEIGHT: 60.5 IN | WEIGHT: 132 LBS | DIASTOLIC BLOOD PRESSURE: 74 MMHG

## 2023-08-03 DIAGNOSIS — E04.1 NONTOXIC SINGLE THYROID NODULE: ICD-10-CM

## 2023-08-03 PROCEDURE — 99204 OFFICE O/P NEW MOD 45 MIN: CPT

## 2023-08-03 NOTE — PROCEDURE
[TextEntry] : After obtaining informed consent and application of a topical cooling spray, a total of two ultrasound-guided FNA biopsies were performed.  (1) First, the left mid 3.2 cm complex thyroid nodule was biopsied using one pass of a 25-gauge needle followed by one pass of a 22-gauge needle.  A drop from each pass was placed into a ThyroSeq tube to potentially be sent for ThyroSeq.  All remaining aspirated material was washed into CytoLyt.  (2) Next, right lower 2.1 cm complex thyroid nodule was biopsied using one pass of a 25-gauge needle followed by one pass of a 22-gauge needle.  A drop from each pass was placed into a ThyroSeq tube to potentially be sent for ThyroSeq.  All remaining aspirated material was washed into CytoLyt.  She tolerated the procedure well.

## 2023-08-04 ENCOUNTER — NON-APPOINTMENT (OUTPATIENT)
Age: 78
End: 2023-08-04

## 2023-08-04 NOTE — ASSESSMENT
[FreeTextEntry1] : Assessment:  77-year-old woman, with history significant for ACDF x 3, presents with bilateral thyroid nodules.   Plan: - The nature of thyroid nodules, as well as the indications for biopsy and thyroidectomy, were discussed with Ms. Rehman and her son and I explained that a biopsy in general is recommended for solid thyroid nodules > 1 cm in diameter and complex thyroid nodules > 1.5 cm in diameter.  Based upon these guidelines, I have recommended that she have FNA biopsies of (1) the left mid 3.2 cm complex thyroid nodule and (2) the right lower 2.1 cm complex thyroid nodule.  Will arrange for the biopsies to be performed by radiology in order to have on-site cytology. - Finally, will request her prior ultrasound report from Dr. Priest's office. - The patient and her son expressed understanding of the above and agree with this plan.  She was instructed to follow-up after having the above biopsies to review the results and further management.

## 2023-08-04 NOTE — PHYSICAL EXAM
[Midline] : located in midline position [Normal] : orientation to person, place, and time: normal [de-identified] : There are two moderate-sized parallel transverse cervical scars along the right mid anterolateral neck from prior ACDFs.  There is also a posterior vertical midline neck scar from third posterior approach.  There is mild right-sided anterior neck fullness appreciated on inspection which is soft.  A limited in-office ultrasound revealed a heterogenous thyroid with dominant bilateral heterogenous well-circumscribed thyroid nodules including a smaller right-sided nodule and a large more complex left-sided nodule.  Of note, she was unable to lie flat and fully extend her neck. [de-identified] : Extremities: HATCH x 4.   Skin: No obvious skin lesions.   Voice: clear

## 2023-08-04 NOTE — CONSULT LETTER
[Dear  ___] : Dear  [unfilled], [FreeTextEntry1] : I had the pleasure of seeing your patient, Tosha Rehman, for consultation and evaluation of thyroid nodules.  Please see my full office note below and feel free to contact me with any questions.  Thank you for allowing me to participate in the care of your patient.  Sincerely,  Amilcar Yuan MD, FACS Chief of Endocrine Surgery, [facility] Assistant Professor of Surgery, [employer] Division of Endocrine Surgery, Department of Surgery [address] [address] [phone] [email or web address]

## 2023-08-04 NOTE — ADDENDUM
[FreeTextEntry1] : 8/4/2023 - Records from Dr. Priest received and reviewed including several prior ultrasounds as well as a prior FNA biopsy.  When compared to her prior ultrasounds, the dominant right thyroid nodule has enlarged, the smaller right thyroid nodules are newly reported, and the dominant left thyroid nodule has remained relatively stable in size over the past 5 years.  FNA biopsy of the dominant left thyroid nodule, performed on 09/08/2017 ([facility]) at which time the nodule measured 3.7 cm, was felt to be consistent with a benign follicular nodule ([place] II).  Considering that she already undergone a benign biopsy of her stable left thyroid nodule, will only arrange for a FNA biopsy of the enlarging right lower 2.1 cm complex thyroid nodule.  The above was discussed with the patient over the phone.  She was instructed to follow-up once she has had the biopsy to review the results and further management.

## 2023-08-04 NOTE — HISTORY OF PRESENT ILLNESS
[de-identified] : Ms. Rehman is a 77-year-old woman, with history significant for ACDF x 3, who presents for initial evaluation of thyroid nodules.  She states that she was diagnosed with thyroid nodules on physical exam several years ago by Dr. Priest and underwent an in-office ultrasound at the time.   She has not had a biopsy.  A recent follow-up neck ultrasound, performed 07/13/2023 ([facility]), reported a right lower 2.1 cm complex thyroid nodule, a right mid 0.5 cm hypoechoic solid thyroid nodule, a right upper 0.4 cm hypoechoic solid thyroid nodule, a right mid 0.7 cm hypoechoic solid thyroid nodule, and a left mid 3.2 cm complex thyroid nodule.  There was no lymphadenopathy appreciated.  She denies known family history of thyroid cancer or personal history of dysphagia or recent voice changes.  Labs performed 04/18/2023 revealed TSH = 2.03 and serum calcium = 9.5.

## 2023-08-04 NOTE — REASON FOR VISIT
[Initial Consultation] : an initial consultation for [Other: _____] : [unfilled] [FreeTextEntry2] : thyroid nodules

## 2023-08-30 ENCOUNTER — OUTPATIENT (OUTPATIENT)
Dept: OUTPATIENT SERVICES | Facility: HOSPITAL | Age: 78
LOS: 1 days | End: 2023-08-30
Payer: MEDICARE

## 2023-08-30 ENCOUNTER — RESULT REVIEW (OUTPATIENT)
Age: 78
End: 2023-08-30

## 2023-08-30 ENCOUNTER — APPOINTMENT (OUTPATIENT)
Dept: ULTRASOUND IMAGING | Facility: IMAGING CENTER | Age: 78
End: 2023-08-30
Payer: MEDICARE

## 2023-08-30 DIAGNOSIS — Z98.89 OTHER SPECIFIED POSTPROCEDURAL STATES: Chronic | ICD-10-CM

## 2023-08-30 DIAGNOSIS — Z98.49 CATARACT EXTRACTION STATUS, UNSPECIFIED EYE: Chronic | ICD-10-CM

## 2023-08-30 DIAGNOSIS — Z98.890 OTHER SPECIFIED POSTPROCEDURAL STATES: Chronic | ICD-10-CM

## 2023-08-30 DIAGNOSIS — E04.2 NONTOXIC MULTINODULAR GOITER: ICD-10-CM

## 2023-08-30 PROCEDURE — 88173 CYTOPATH EVAL FNA REPORT: CPT | Mod: 26

## 2023-08-30 PROCEDURE — 10005 FNA BX W/US GDN 1ST LES: CPT

## 2023-08-30 PROCEDURE — 88172 CYTP DX EVAL FNA 1ST EA SITE: CPT

## 2023-08-30 PROCEDURE — 88173 CYTOPATH EVAL FNA REPORT: CPT

## 2023-09-13 ENCOUNTER — APPOINTMENT (OUTPATIENT)
Dept: SURGERY | Facility: CLINIC | Age: 78
End: 2023-09-13
Payer: MEDICARE

## 2023-09-13 DIAGNOSIS — E04.2 NONTOXIC MULTINODULAR GOITER: ICD-10-CM

## 2023-09-13 PROCEDURE — 99441: CPT

## 2023-09-18 ENCOUNTER — APPOINTMENT (OUTPATIENT)
Dept: CARDIOLOGY | Facility: CLINIC | Age: 78
End: 2023-09-18
Payer: MEDICARE

## 2023-09-18 VITALS
SYSTOLIC BLOOD PRESSURE: 127 MMHG | HEIGHT: 60.05 IN | BODY MASS INDEX: 25.96 KG/M2 | OXYGEN SATURATION: 100 % | DIASTOLIC BLOOD PRESSURE: 70 MMHG | HEART RATE: 70 BPM | WEIGHT: 134 LBS

## 2023-09-18 DIAGNOSIS — R94.31 ABNORMAL ELECTROCARDIOGRAM [ECG] [EKG]: ICD-10-CM

## 2023-09-18 PROCEDURE — 93000 ELECTROCARDIOGRAM COMPLETE: CPT

## 2023-09-18 PROCEDURE — 99204 OFFICE O/P NEW MOD 45 MIN: CPT | Mod: 25

## 2023-10-18 ENCOUNTER — APPOINTMENT (OUTPATIENT)
Dept: CARDIOLOGY | Facility: CLINIC | Age: 78
End: 2023-10-18
Payer: MEDICARE

## 2023-10-18 PROCEDURE — 93306 TTE W/DOPPLER COMPLETE: CPT

## 2023-10-23 ENCOUNTER — APPOINTMENT (OUTPATIENT)
Dept: NEUROLOGY | Facility: CLINIC | Age: 78
End: 2023-10-23

## 2023-11-01 ENCOUNTER — APPOINTMENT (OUTPATIENT)
Dept: CARDIOLOGY | Facility: CLINIC | Age: 78
End: 2023-11-01

## 2023-11-02 ENCOUNTER — NON-APPOINTMENT (OUTPATIENT)
Age: 78
End: 2023-11-02

## 2023-12-08 ENCOUNTER — NON-APPOINTMENT (OUTPATIENT)
Age: 78
End: 2023-12-08

## 2023-12-11 ENCOUNTER — APPOINTMENT (OUTPATIENT)
Dept: CARDIOLOGY | Facility: CLINIC | Age: 78
End: 2023-12-11
Payer: MEDICARE

## 2023-12-11 PROCEDURE — 93015 CV STRESS TEST SUPVJ I&R: CPT

## 2023-12-11 PROCEDURE — A9500: CPT

## 2023-12-11 PROCEDURE — 78452 HT MUSCLE IMAGE SPECT MULT: CPT

## 2024-01-03 ENCOUNTER — NON-APPOINTMENT (OUTPATIENT)
Age: 79
End: 2024-01-03

## 2024-02-29 ENCOUNTER — OFFICE (OUTPATIENT)
Dept: URBAN - METROPOLITAN AREA CLINIC 70 | Facility: CLINIC | Age: 79
Setting detail: OPHTHALMOLOGY
End: 2024-02-29
Payer: MEDICARE

## 2024-02-29 DIAGNOSIS — H40.053: ICD-10-CM

## 2024-02-29 PROBLEM — H16.223 DRY EYE SYNDROME K SICCA; BOTH EYES: Status: ACTIVE | Noted: 2024-02-29

## 2024-02-29 PROCEDURE — 92004 COMPRE OPH EXAM NEW PT 1/>: CPT | Performed by: OPHTHALMOLOGY

## 2024-02-29 PROCEDURE — 92133 CPTRZD OPH DX IMG PST SGM ON: CPT | Performed by: OPHTHALMOLOGY

## 2024-02-29 ASSESSMENT — CONFRONTATIONAL VISUAL FIELD TEST (CVF)
OS_FINDINGS: FULL
OD_FINDINGS: FULL

## 2024-03-14 NOTE — ED ADULT NURSE NOTE - NSSUSCREENINGQ5_ED_ALL_ED
Caller: Maylin Garcia    Relationship: Self    Best call back number: [phone]    What is the medical concern/diagnosis: TIME FOR ROUTINE MAMMOGRAM     What specialty or service is being requested: REFERRAL TO A NEW OB/GYN (HER LAST ONE RETIRED)     What is the provider, practice or medical service name: [facility], [facility] FEM[facility]     Any additional details:     PLEASE ADVISE      No

## 2024-04-03 ENCOUNTER — EMERGENCY (EMERGENCY)
Facility: HOSPITAL | Age: 79
LOS: 1 days | Discharge: HOME | End: 2024-04-03
Attending: EMERGENCY MEDICINE | Admitting: EMERGENCY MEDICINE
Payer: COMMERCIAL

## 2024-04-03 VITALS
SYSTOLIC BLOOD PRESSURE: 135 MMHG | TEMPERATURE: 98 F | DIASTOLIC BLOOD PRESSURE: 65 MMHG | WEIGHT: 130.07 LBS | OXYGEN SATURATION: 99 % | RESPIRATION RATE: 18 BRPM | HEART RATE: 82 BPM

## 2024-04-03 DIAGNOSIS — Z98.89 OTHER SPECIFIED POSTPROCEDURAL STATES: Chronic | ICD-10-CM

## 2024-04-03 DIAGNOSIS — Z98.890 OTHER SPECIFIED POSTPROCEDURAL STATES: Chronic | ICD-10-CM

## 2024-04-03 DIAGNOSIS — Z98.49 CATARACT EXTRACTION STATUS, UNSPECIFIED EYE: Chronic | ICD-10-CM

## 2024-04-03 PROCEDURE — 73562 X-RAY EXAM OF KNEE 3: CPT

## 2024-04-03 PROCEDURE — 99284 EMERGENCY DEPT VISIT MOD MDM: CPT | Mod: 25

## 2024-04-03 PROCEDURE — 71046 X-RAY EXAM CHEST 2 VIEWS: CPT | Mod: 26

## 2024-04-03 PROCEDURE — 99284 EMERGENCY DEPT VISIT MOD MDM: CPT

## 2024-04-03 PROCEDURE — 73562 X-RAY EXAM OF KNEE 3: CPT | Mod: 26,LT

## 2024-04-03 PROCEDURE — 71046 X-RAY EXAM CHEST 2 VIEWS: CPT

## 2024-04-03 RX ORDER — ACETAMINOPHEN 500 MG
650 TABLET ORAL ONCE
Refills: 0 | Status: COMPLETED | OUTPATIENT
Start: 2024-04-03 | End: 2024-04-03

## 2024-04-03 RX ADMIN — Medication 650 MILLIGRAM(S): at 12:14
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
## 2024-04-03 NOTE — ED ADULT NURSE NOTE - NSFALLASSESSNEED_ED_ALL_ED
Anesthetic History No history of anesthetic complications Review of Systems / Medical History Patient summary reviewed, nursing notes reviewed and pertinent labs reviewed Pulmonary Within defined limits Neuro/Psych CVA TIA Cardiovascular Hypertension Exercise tolerance: >4 METS 
  
GI/Hepatic/Renal 
  
GERD Endo/Other Obesity and arthritis Other Findings Physical Exam 
 
Airway Mallampati: II 
TM Distance: 4 - 6 cm Neck ROM: normal range of motion Mouth opening: Normal 
 
 Cardiovascular Regular rate and rhythm,  S1 and S2 normal,  no murmur, click, rub, or gallop Dental 
No notable dental hx Pulmonary Breath sounds clear to auscultation Abdominal 
GI exam deferred Other Findings Anesthetic Plan ASA: 3 Anesthesia type: total IV anesthesia Induction: Intravenous Anesthetic plan and risks discussed with: Patient
no

## 2024-04-03 NOTE — ED PROVIDER NOTE - PATIENT PORTAL LINK FT
You can access the FollowMyHealth Patient Portal offered by [facility] by registering at the following website: [email or web address]. By joining [facility]’s FollowMyHealth portal, you will also be able to view your health information using other applications (apps) compatible with our system.

## 2024-04-03 NOTE — ED PROVIDER NOTE - NSFOLLOWUPINSTRUCTIONS_ED_ALL_ED_FT
-- You should update your primary care physician on your Emergency Department visit and follow up with them.  If you do not have a physician or have difficulty following up, please call: [phone] to obtain a [facility] doctor or specialist who can provide follow up.    -- Take ibuprofen 400 mg every 6 hours with food, as needed for pain    -- Take tylenol 650 mg every 4 hours, as needed for pain    -- Return to the ER for worsening or persistent symptoms, and/or ANY NEW OR CONCERNING SYMPTOMS.

## 2024-04-03 NOTE — ED PROVIDER NOTE - PHYSICAL EXAMINATION
Gen: alert, NAD  HEENT:  NC/AT, PERR  CV:  well perfused  Pulm:  normal RR, breathing comfortably, CTA b/l  Abd: s/nt/nd  MSK: moving all extremities, mild chest wall ttp  Neuro:  non-focal  Skin:  visualized areas intact  Psych: AOx3

## 2024-04-03 NOTE — ED ADULT NURSE NOTE - NSFALLUNIVINTERV_ED_ALL_ED
Bed/Stretcher in lowest position, wheels locked, appropriate side rails in place/Call bell, personal items and telephone in reach/Instruct patient to call for assistance before getting out of bed/chair/stretcher/Non-slip footwear applied when patient is off stretcher/[place] to call system/Physically safe environment - no spills, clutter or unnecessary equipment/Purposeful proactive rounding/Room/bathroom lighting operational, light cord in reach

## 2024-04-06 ENCOUNTER — NON-APPOINTMENT (OUTPATIENT)
Age: 79
End: 2024-04-06

## 2024-04-15 ENCOUNTER — APPOINTMENT (OUTPATIENT)
Dept: INTERNAL MEDICINE | Facility: CLINIC | Age: 79
End: 2024-04-15
Payer: MEDICARE

## 2024-04-15 VITALS
SYSTOLIC BLOOD PRESSURE: 118 MMHG | OXYGEN SATURATION: 98 % | HEART RATE: 79 BPM | RESPIRATION RATE: 16 BRPM | DIASTOLIC BLOOD PRESSURE: 72 MMHG | WEIGHT: 132 LBS | HEIGHT: 60 IN | TEMPERATURE: 98.1 F | BODY MASS INDEX: 25.91 KG/M2

## 2024-04-15 DIAGNOSIS — R07.89 OTHER CHEST PAIN: ICD-10-CM

## 2024-04-15 PROCEDURE — 99213 OFFICE O/P EST LOW 20 MIN: CPT

## 2024-04-15 PROCEDURE — G2211 COMPLEX E/M VISIT ADD ON: CPT | Mod: NC,1L

## 2024-04-15 RX ORDER — MELOXICAM 15 MG/1
15 TABLET ORAL
Qty: 30 | Refills: 1 | Status: ACTIVE | COMMUNITY
Start: 2024-04-15 | End: 1900-01-01

## 2024-04-15 NOTE — PHYSICAL EXAM
[No Edema] : there was no peripheral edema [Normal] : affect was normal and insight and judgment were intact [de-identified] : tendeness mid chest and pain in chest with movement
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
## 2024-07-01 ENCOUNTER — NON-APPOINTMENT (OUTPATIENT)
Age: 79
End: 2024-07-01

## 2024-07-31 ENCOUNTER — OFFICE (OUTPATIENT)
Dept: URBAN - METROPOLITAN AREA CLINIC 70 | Facility: CLINIC | Age: 79
Setting detail: OPHTHALMOLOGY
End: 2024-07-31
Payer: MEDICARE

## 2024-07-31 DIAGNOSIS — H16.223: ICD-10-CM

## 2024-07-31 DIAGNOSIS — H40.053: ICD-10-CM

## 2024-07-31 PROCEDURE — 76514 ECHO EXAM OF EYE THICKNESS: CPT | Performed by: OPHTHALMOLOGY

## 2024-07-31 PROCEDURE — 92083 EXTENDED VISUAL FIELD XM: CPT | Performed by: OPHTHALMOLOGY

## 2024-07-31 PROCEDURE — 99213 OFFICE O/P EST LOW 20 MIN: CPT | Performed by: OPHTHALMOLOGY

## 2024-07-31 ASSESSMENT — CONFRONTATIONAL VISUAL FIELD TEST (CVF)
OD_FINDINGS: FULL
OS_FINDINGS: FULL

## 2024-08-06 ENCOUNTER — APPOINTMENT (OUTPATIENT)
Dept: INTERNAL MEDICINE | Facility: CLINIC | Age: 79
End: 2024-08-06

## 2024-08-06 ENCOUNTER — NON-APPOINTMENT (OUTPATIENT)
Age: 79
End: 2024-08-06

## 2024-08-06 PROCEDURE — 93000 ELECTROCARDIOGRAM COMPLETE: CPT

## 2024-08-06 PROCEDURE — G0439: CPT

## 2024-08-06 NOTE — HISTORY OF PRESENT ILLNESS
[de-identified] : 78 year old female who presents today for AWV.  She feels well. She will be going for a procedure to reduce ocular pressure this month with her ophthalmologist.

## 2024-08-06 NOTE — HEALTH RISK ASSESSMENT
[Good] : ~his/her~  mood as  good [No] : In the past 12 months have you used drugs other than those required for medical reasons? No [No falls in past year] : Patient reported no falls in the past year [Little interest or pleasure doing things] : 1) Little interest or pleasure doing things [Feeling down, depressed, or hopeless] : 2) Feeling down, depressed, or hopeless [0] : 2) Feeling down, depressed, or hopeless: Not at all (0) [PHQ-2 Negative - No further assessment needed] : PHQ-2 Negative - No further assessment needed [Never] : Never [NO] : No [Patient declined mammogram] : Patient declined mammogram [Patient declined colonoscopy] : Patient declined colonoscopy [With Family] : lives with family [Retired] : retired [] :  [Feels Safe at Home] : Feels safe at home [Fully functional (bathing, dressing, toileting, transferring, walking, feeding)] : Fully functional (bathing, dressing, toileting, transferring, walking, feeding) [Fully functional (using the telephone, shopping, preparing meals, housekeeping, doing laundry, using] : Fully functional and needs no help or supervision to perform IADLs (using the telephone, shopping, preparing meals, housekeeping, doing laundry, using transportation, managing medications and managing finances) [Reports normal functional visual acuity (ie: able to read med bottle)] : Reports normal functional visual acuity [Smoke Detector] : smoke detector [Carbon Monoxide Detector] : carbon monoxide detector [Seat Belt] :  uses seat belt [Audit-CScore] : 0 [facility] : 5 [Reports changes in hearing] : Reports no changes in hearing [Reports changes in vision] : Reports no changes in vision [Reports changes in dental health] : Reports no changes in dental health

## 2024-08-06 NOTE — PLAN
[FreeTextEntry1] : HCM:  - Discussed screening mammogram, pt declines  - Discussed screening colonoscopy, pt declines  - She has h/o osteopenia, will check bone density scan  - EKG is NSR  - Discussed Shingrix, pt will consider.  - Check routine labs today

## 2024-08-30 ENCOUNTER — RX ONLY (RX ONLY)
Age: 79
End: 2024-08-30

## 2024-11-18 ENCOUNTER — APPOINTMENT (OUTPATIENT)
Dept: INTERNAL MEDICINE | Facility: CLINIC | Age: 79
End: 2024-11-18
Payer: MEDICARE

## 2024-11-18 VITALS
RESPIRATION RATE: 14 BRPM | HEART RATE: 84 BPM | DIASTOLIC BLOOD PRESSURE: 68 MMHG | TEMPERATURE: 98.4 F | HEIGHT: 61 IN | SYSTOLIC BLOOD PRESSURE: 124 MMHG | OXYGEN SATURATION: 97 % | WEIGHT: 135 LBS | BODY MASS INDEX: 25.49 KG/M2

## 2024-11-18 DIAGNOSIS — H40.9 UNSPECIFIED GLAUCOMA: ICD-10-CM

## 2024-11-18 DIAGNOSIS — Z01.818 ENCOUNTER FOR OTHER PREPROCEDURAL EXAMINATION: ICD-10-CM

## 2024-11-18 PROCEDURE — 99213 OFFICE O/P EST LOW 20 MIN: CPT

## 2024-11-20 ENCOUNTER — ASC (OUTPATIENT)
Dept: URBAN - METROPOLITAN AREA SURGERY 8 | Facility: SURGERY | Age: 79
Setting detail: OPHTHALMOLOGY
End: 2024-11-20
Payer: MEDICARE

## 2024-11-20 DIAGNOSIS — H40.052: ICD-10-CM

## 2024-11-20 DIAGNOSIS — H40.051: ICD-10-CM

## 2024-11-20 PROCEDURE — 65820 GONIOTOMY: CPT | Mod: 50,51 | Performed by: OPHTHALMOLOGY

## 2024-11-21 ENCOUNTER — OFFICE (OUTPATIENT)
Dept: URBAN - METROPOLITAN AREA CLINIC 70 | Facility: CLINIC | Age: 79
Setting detail: OPHTHALMOLOGY
End: 2024-11-21
Payer: MEDICARE

## 2024-11-21 DIAGNOSIS — H40.053: ICD-10-CM

## 2024-11-21 PROCEDURE — 99024 POSTOP FOLLOW-UP VISIT: CPT | Performed by: OPHTHALMOLOGY

## 2024-11-21 ASSESSMENT — KERATOMETRY
OS_AXISANGLE_DEGREES: 090
OD_K1POWER_DIOPTERS: 44.75
OD_K2POWER_DIOPTERS: 45.50
OS_K1POWER_DIOPTERS: 45.00
OD_AXISANGLE_DEGREES: 025
OS_K2POWER_DIOPTERS: 45.00

## 2024-11-21 ASSESSMENT — CONFRONTATIONAL VISUAL FIELD TEST (CVF)
OD_FINDINGS: FULL
OS_FINDINGS: FULL

## 2024-11-21 ASSESSMENT — REFRACTION_AUTOREFRACTION
OS_AXIS: 082
OD_SPHERE: +1.50
OS_SPHERE: 0.00
OS_CYLINDER: -1.00
OD_CYLINDER: -2.75
OD_AXIS: 101

## 2024-11-21 ASSESSMENT — VISUAL ACUITY
OD_BCVA: 20/20-2
OS_BCVA: 20/40-2

## 2024-11-27 ENCOUNTER — OFFICE (OUTPATIENT)
Dept: URBAN - METROPOLITAN AREA CLINIC 109 | Facility: CLINIC | Age: 79
Setting detail: OPHTHALMOLOGY
End: 2024-11-27
Payer: MEDICARE

## 2024-11-27 DIAGNOSIS — H40.053: ICD-10-CM

## 2024-11-27 PROCEDURE — 99024 POSTOP FOLLOW-UP VISIT: CPT | Performed by: OPTOMETRIST

## 2024-11-27 ASSESSMENT — TONOMETRY
OD_IOP_MMHG: 16
OS_IOP_MMHG: 16

## 2024-11-27 ASSESSMENT — KERATOMETRY
OD_K1POWER_DIOPTERS: 44.75
OS_K2POWER_DIOPTERS: 45.00
OD_K2POWER_DIOPTERS: 45.50
OS_K1POWER_DIOPTERS: 45.00
OS_AXISANGLE_DEGREES: 090
OD_AXISANGLE_DEGREES: 025

## 2024-11-27 ASSESSMENT — REFRACTION_AUTOREFRACTION
OD_CYLINDER: -2.75
OS_SPHERE: 0.00
OS_CYLINDER: -1.00
OD_AXIS: 101
OD_SPHERE: +1.50
OS_AXIS: 082

## 2024-11-27 ASSESSMENT — VISUAL ACUITY
OS_BCVA: 20/40+2
OD_BCVA: 20/20-1

## 2024-11-27 ASSESSMENT — CONFRONTATIONAL VISUAL FIELD TEST (CVF)
OS_FINDINGS: FULL
OD_FINDINGS: FULL

## 2024-12-03 ENCOUNTER — OFFICE (OUTPATIENT)
Dept: URBAN - METROPOLITAN AREA CLINIC 70 | Facility: CLINIC | Age: 79
Setting detail: OPHTHALMOLOGY
End: 2024-12-03
Payer: MEDICARE

## 2024-12-03 DIAGNOSIS — H40.053: ICD-10-CM

## 2024-12-03 PROCEDURE — 99024 POSTOP FOLLOW-UP VISIT: CPT | Performed by: OPHTHALMOLOGY

## 2024-12-03 ASSESSMENT — KERATOMETRY
OD_K2POWER_DIOPTERS: 45.50
OS_K2POWER_DIOPTERS: 45.25
OS_K1POWER_DIOPTERS: 45.25
OD_AXISANGLE_DEGREES: 019
OD_K1POWER_DIOPTERS: 44.50
OS_AXISANGLE_DEGREES: 090

## 2024-12-03 ASSESSMENT — REFRACTION_AUTOREFRACTION
OD_CYLINDER: -2.50
OD_SPHERE: +1.25
OS_AXIS: 089
OD_AXIS: 098
OS_SPHERE: PLANO
OS_CYLINDER: -1.00

## 2024-12-03 ASSESSMENT — VISUAL ACUITY
OS_BCVA: 20/30
OD_BCVA: 20/25

## 2024-12-03 ASSESSMENT — CONFRONTATIONAL VISUAL FIELD TEST (CVF)
OD_FINDINGS: FULL
OS_FINDINGS: FULL

## 2024-12-19 ENCOUNTER — RX ONLY (RX ONLY)
Age: 79
End: 2024-12-19

## 2024-12-19 ENCOUNTER — OFFICE (OUTPATIENT)
Dept: URBAN - METROPOLITAN AREA CLINIC 70 | Facility: CLINIC | Age: 79
Setting detail: OPHTHALMOLOGY
End: 2024-12-19
Payer: MEDICARE

## 2024-12-19 DIAGNOSIS — H40.053: ICD-10-CM

## 2024-12-19 PROCEDURE — 99024 POSTOP FOLLOW-UP VISIT: CPT | Performed by: OPHTHALMOLOGY

## 2024-12-19 ASSESSMENT — KERATOMETRY
OS_K2POWER_DIOPTERS: 45.75
OD_AXISANGLE_DEGREES: 019
OS_K1POWER_DIOPTERS: 45.25
OD_K2POWER_DIOPTERS: 45.75
OS_AXISANGLE_DEGREES: 069
OD_K1POWER_DIOPTERS: 44.75

## 2024-12-19 ASSESSMENT — CONFRONTATIONAL VISUAL FIELD TEST (CVF)
OD_FINDINGS: FULL
OS_FINDINGS: FULL

## 2024-12-19 ASSESSMENT — REFRACTION_AUTOREFRACTION
OS_CYLINDER: -1.00
OD_SPHERE: +1.50
OD_CYLINDER: -3.00
OS_SPHERE: 0.00
OS_AXIS: 085
OD_AXIS: 099

## 2024-12-19 ASSESSMENT — VISUAL ACUITY
OS_BCVA: 20/50-2
OD_BCVA: 20/20-2

## 2025-01-22 ENCOUNTER — OFFICE (OUTPATIENT)
Dept: URBAN - METROPOLITAN AREA CLINIC 70 | Facility: CLINIC | Age: 80
Setting detail: OPHTHALMOLOGY
End: 2025-01-22
Payer: MEDICARE

## 2025-01-22 DIAGNOSIS — H16.223: ICD-10-CM

## 2025-01-22 DIAGNOSIS — H40.053: ICD-10-CM

## 2025-01-22 PROCEDURE — 99213 OFFICE O/P EST LOW 20 MIN: CPT | Performed by: OPHTHALMOLOGY

## 2025-01-22 ASSESSMENT — CONFRONTATIONAL VISUAL FIELD TEST (CVF)
OD_FINDINGS: FULL
OS_FINDINGS: FULL

## 2025-01-22 ASSESSMENT — KERATOMETRY
OD_K2POWER_DIOPTERS: 45.50
OS_K2POWER_DIOPTERS: 45.25
OD_AXISANGLE_DEGREES: 015
OS_AXISANGLE_DEGREES: 026
OD_K1POWER_DIOPTERS: 44.25
OS_K1POWER_DIOPTERS: 45.00

## 2025-01-22 ASSESSMENT — REFRACTION_AUTOREFRACTION
OD_AXIS: 096
OS_SPHERE: PLANO
OD_CYLINDER: -2.75
OS_CYLINDER: -1.00
OS_AXIS: 091
OD_SPHERE: +1.50

## 2025-01-22 ASSESSMENT — VISUAL ACUITY
OD_BCVA: 20/20
OS_BCVA: 20/50+2

## 2025-02-08 NOTE — ASU PATIENT PROFILE, ADULT - NS PRO ABUSE SCREEN SUSPICION NEGLECT YN
"[facility]  Consult Note - Hospitalist Service  Date of Admission:  2/5/2025  Consult Requested by: Kenneth Gibson MD   Reason for Consult: \"Pain\"    Assessment & Plan   Jonny Gramajo is a 38 year old male with PMH of prediabetes, severe RASHEED, chronic Lyme disease, HTN, obesity, GERD, hypogonadism, schizoaffective disorder, panic attacks, ADHD, and depression admitted on 2/5/2025 to inpatient psychiatry. Medicine was consulted for chronic pain secondary to chronic Lyme disease.    # Lyme disease, chronic  # Neuropathic pain  Patient reports initial diagnosis in 1993 when he was in second grade. He notes that he lived in [place] in an endemic area and had a tick bite on the left side of his face. He reports antibiotic treatment for 17 years. Since his diagnosis he states he has dealt with chronic neuropathic pain. Pain has worsened significantly in the past couple of days. He describes this as shooting prickly pain going down both of his arms. He reports being told that that type of pain is due to the spirochete invading his spinal cord. He finds gabapentin, Tylenol, and naproxen to be helpful, but wonders what else can be added.   - Will obtain cervical spine XR to rule out cervical cause of neuropathic pain  - Increased gabapentin from 300 mg TID to 400 mg TID  - Continue BID Naproxen 500 mg BID with meals  - Continue APAP 650 mg Q4H PRN  - Continue hydroxyzine 25 mg Q4H PRN  - Consider rheum consult if not improving    # HTN  Ongoing, has taken antihypertensives for periods of time in the past. Patient asks about starting Benicar as he has heard this can be helpful with neuropathy from Lyme disease due to \"its ability to block angiotension II receptors by up to 20%\" per patient. Unable to find this in the literature at this time. Currently BP is near goal with mild elevations. Appears to be downward trending compared to day of admission. Currently on clonidine " with room to increase.   - Continue to monitor  - Continue clonidine 0.1 mg every day      The patient's care was discussed with the Bedside Nurse and Patient.    Clinically Significant Risk Factors                                  # Financial/Environmental Concerns: none         Medicine will continue to follow to monitor pain and blood pressure. Recommendations relayed to primary team via this progress note. Thank you for the opportunity to be involved in this patient's care. Please notify on call [facility] if any intercurrent medical issues arise.      Ciara Lawson PA-C  Hospitalist Service  Securely message with [employer] (more info)  Text page via [facility] Paging/Directory   ______________________________________________________________________    Chief Complaint   Neuropathic pain    History is obtained from the patient    History of Present Illness   Jonny Gramajo is a 38 year old male with PMH of prediabetes, severe RASHEED, chronic Lyme disease, HTN, obesity, GERD, hypogonadism, schizoaffective disorder, panic attacks, ADHD, and depression admitted on 2/5/2025 to inpatient psychiatry. Medicine was consulted for chronic pain secondary to chronic Lyme disease.    Patient reports initial diagnosis in 1993 when he was in second grade. He notes that he lived in [place] in an endemic area and had a tick bite on the left side of his face. He reports antibiotic treatment for 17 years. Since his diagnosis he states he has dealt with chronic neuropathic pain. Pain has worsened significantly in the past couple of days. He describes this as shooting prickly pain going down both of his arms. He reports being told that that type of pain is due to the spirochete invading his spinal cord. He finds gabapentin, Tylenol, and naproxen to be helpful, but wonders what else can be added.     Past Medical History    Past Medical History:   Diagnosis Date    Anxiety     Class 1 obesity due to excess calories without serious comorbidity with  body mass index (BMI) of 34.0 to 34.9 in adult     Depression     Depressive disorder     Elevated ALT measurement     Hypertension     Lyme disease     Lyme disease        Past Surgical History   Past Surgical History:   Procedure Laterality Date    GENITOURINARY SURGERY      testical recessed    [facility]  1/19/2017            Medications   Current Facility-Administered Medications   Medication Dose Route Frequency Provider Last Rate Last Admin    acetaminophen (TYLENOL) tablet 650 mg  650 mg Oral Q4H PRN Rubi Shukla MD   650 mg at 02/08/25 0954    alum & mag hydroxide-simethicone (MAALOX) suspension 30 mL  30 mL Oral Q4H PRN Rubi Shukla MD        cariprazine (VRAYLAR) capsule 1.5 mg  1.5 mg Oral Daily Kenneth Gibson MD   1.5 mg at 02/08/25 0744    cetirizine (zyrTEC) tablet 10 mg  10 mg Oral Daily Kenneth Gibson MD   10 mg at 02/08/25 0744    clonazePAM (klonoPIN) tablet 2 mg  2 mg Oral TID Hardy Hoover APRN CNP   2 mg at 02/08/25 0745    cloNIDine (CATAPRES) tablet 0.1 mg  0.1 mg Oral At Bedtime Kenneth Gibson MD   0.1 mg at 02/07/25 2110    gabapentin (NEURONTIN) capsule 400 mg  400 mg Oral TID Ciara Lawson PA-C        hydrOXYzine HCl (ATARAX) tablet 25 mg  25 mg Oral Q4H PRN Rubi Shukla MD   25 mg at 02/08/25 0954    melatonin tablet 3 mg  3 mg Oral At Bedtime PRN Kenneth Gibson MD   3 mg at 02/07/25 2330    metFORMIN (GLUCOPHAGE XR) 24 hr tablet 1,000 mg  1,000 mg Oral Daily with supper Kenneth Gibson MD   1,000 mg at 02/07/25 1755    naproxen (NAPROSYN) tablet 500 mg  500 mg Oral BID w/meals Kenneth Gibson MD   500 mg at 02/08/25 0744    nicotine (NICODERM CQ) 21 MG/24HR 24 hr patch 1 patch  1 patch Transdermal Daily Kenneth Gibson MD   1 patch at 02/08/25 0744    nicotine polacrilex (NICORETTE) gum 4 mg  4 mg Buccal Q1H PRN Kenneth Gibson MD   4 mg at 02/08/25 1126    OLANZapine (zyPREXA) tablet 10 mg  10 mg Oral TID PRN Rubi Shukla MD         Or    OLANZapine (zyPREXA) injection 10 mg  10 mg Intramuscular TID PRN Rubi Shukla MD        OLANZapine (zyPREXA) tablet 30 mg  30 mg Oral At Bedtime Andriy Holman MD   30 mg at 02/07/25 2115    pantoprazole (PROTONIX) EC tablet 40 mg  40 mg Oral Daily Andriy Holman MD   40 mg at 02/08/25 0744    senna-docusate (SENOKOT-S/PERICOLACE) 8.6-50 MG per tablet 1 tablet  1 tablet Oral BID PRN Rubi Shukla MD        testosterone (ANDROGEL) topical gel 60.75 mg  60.75 mg Transdermal Kenneth Rao MD   60.75 mg at 02/08/25 0744    traZODone (DESYREL) tablet 50 mg  50 mg Oral At Bedtime PRN Rubi Shukla MD   50 mg at 02/07/25 2330     Facility-Administered Medications Ordered in Other Encounters   Medication Dose Route Frequency Provider Last Rate Last Admin    Self Administer Medications: Behavioral Services   Does not apply See Admin Instructions Eliud Vital MD               Physical Exam   Vital Signs: Temp: 97  F (36.1  C) Temp src: Oral BP: (!) 141/89 Pulse: 108   Resp: 16 SpO2: 96 % O2 Device: None (Room air)    Weight: 0 lbs 0 oz    GEN: NAD. Appears stated age. Anxious.  HEENT: Normocephalic, without obvious abnormality, atraumatic. Conjunctivae clear without exudates or hemorrhage. EOMs intact. Anicteric sclera. MMMs.  CV: RRR. No murmurs, rubs, or gallops. S1 and S2 are of normal intensity. Intact bilateral pedal pulses.  PULM: Respirations even and unlabored. No signs of respiratory distress. Lung sounds clear to auscultation anteriorly.   GI: Soft, non-tender, non-distended. No rebound or guarding.  PSYCH: Appropriate mood and affect. No visual or auditory hallucinations.   HEME/LYMPH: No jaundice or pallor noted.  DERM: Clean, dry, and intact to exposed skin surfaces.     Medical Decision Making       55 MINUTES SPENT BY ME on the date of service doing chart review, history, exam, documentation & further activities per the note.      Data         Imaging results reviewed over  the past 24 hrs:   No results found for this or any previous visit (from the past 24 hours).   no

## 2025-02-18 ENCOUNTER — RX ONLY (RX ONLY)
Age: 80
End: 2025-02-18

## 2025-02-18 ENCOUNTER — OFFICE (OUTPATIENT)
Dept: URBAN - METROPOLITAN AREA CLINIC 70 | Facility: CLINIC | Age: 80
Setting detail: OPHTHALMOLOGY
End: 2025-02-18
Payer: MEDICARE

## 2025-02-18 DIAGNOSIS — H16.223: ICD-10-CM

## 2025-02-18 DIAGNOSIS — H40.053: ICD-10-CM

## 2025-02-18 PROCEDURE — 99213 OFFICE O/P EST LOW 20 MIN: CPT | Performed by: OPHTHALMOLOGY

## 2025-02-18 ASSESSMENT — VISUAL ACUITY
OD_BCVA: 20/25
OS_BCVA: 20/40

## 2025-02-18 ASSESSMENT — KERATOMETRY
OD_K1POWER_DIOPTERS: 44.25
OS_AXISANGLE_DEGREES: 026
OS_K2POWER_DIOPTERS: 45.25
OD_AXISANGLE_DEGREES: 015
OD_K2POWER_DIOPTERS: 45.50
OS_K1POWER_DIOPTERS: 45.00

## 2025-02-18 ASSESSMENT — CONFRONTATIONAL VISUAL FIELD TEST (CVF)
OD_FINDINGS: FULL
OS_FINDINGS: FULL

## 2025-02-18 ASSESSMENT — REFRACTION_AUTOREFRACTION
OD_AXIS: 096
OD_CYLINDER: -2.75
OS_AXIS: 091
OS_CYLINDER: -1.00
OS_SPHERE: PLANO
OD_SPHERE: +1.50

## 2025-05-20 ENCOUNTER — OFFICE (OUTPATIENT)
Dept: URBAN - METROPOLITAN AREA CLINIC 70 | Facility: CLINIC | Age: 80
Setting detail: OPHTHALMOLOGY
End: 2025-05-20
Payer: MEDICARE

## 2025-05-20 DIAGNOSIS — H16.223: ICD-10-CM

## 2025-05-20 DIAGNOSIS — H40.053: ICD-10-CM

## 2025-05-20 PROCEDURE — 92133 CPTRZD OPH DX IMG PST SGM ON: CPT | Performed by: OPHTHALMOLOGY

## 2025-05-20 PROCEDURE — 92014 COMPRE OPH EXAM EST PT 1/>: CPT | Performed by: OPHTHALMOLOGY

## 2025-05-20 ASSESSMENT — CONFRONTATIONAL VISUAL FIELD TEST (CVF)
OD_FINDINGS: FULL
OS_FINDINGS: FULL

## 2025-05-20 ASSESSMENT — KERATOMETRY
OS_AXISANGLE_DEGREES: 090
OS_K1POWER_DIOPTERS: 45.50
OD_AXISANGLE_DEGREES: 023
OS_K2POWER_DIOPTERS: 45.50
OD_K2POWER_DIOPTERS: 45.75
OD_K1POWER_DIOPTERS: 44.25

## 2025-05-20 ASSESSMENT — VISUAL ACUITY
OS_BCVA: 20/40
OD_BCVA: 20/25

## 2025-05-20 ASSESSMENT — REFRACTION_AUTOREFRACTION
OD_SPHERE: +1.75
OS_AXIS: 070
OD_CYLINDER: -3.25
OS_CYLINDER: -0.50
OS_SPHERE: -0.50
OD_AXIS: 093

## 2025-06-12 ENCOUNTER — EMERGENCY (EMERGENCY)
Facility: HOSPITAL | Age: 80
LOS: 1 days | End: 2025-06-12
Attending: EMERGENCY MEDICINE | Admitting: EMERGENCY MEDICINE
Payer: MEDICARE

## 2025-06-12 ENCOUNTER — APPOINTMENT (OUTPATIENT)
Dept: INTERNAL MEDICINE | Facility: CLINIC | Age: 80
End: 2025-06-12
Payer: MEDICARE

## 2025-06-12 VITALS
SYSTOLIC BLOOD PRESSURE: 103 MMHG | OXYGEN SATURATION: 96 % | WEIGHT: 132.06 LBS | RESPIRATION RATE: 16 BRPM | DIASTOLIC BLOOD PRESSURE: 53 MMHG | HEART RATE: 71 BPM | TEMPERATURE: 98 F

## 2025-06-12 VITALS
HEART RATE: 72 BPM | RESPIRATION RATE: 16 BRPM | WEIGHT: 133 LBS | BODY MASS INDEX: 25.11 KG/M2 | SYSTOLIC BLOOD PRESSURE: 118 MMHG | HEIGHT: 61 IN | DIASTOLIC BLOOD PRESSURE: 64 MMHG | TEMPERATURE: 98.1 F | OXYGEN SATURATION: 96 %

## 2025-06-12 VITALS
TEMPERATURE: 98 F | HEART RATE: 67 BPM | SYSTOLIC BLOOD PRESSURE: 148 MMHG | OXYGEN SATURATION: 97 % | DIASTOLIC BLOOD PRESSURE: 65 MMHG | RESPIRATION RATE: 17 BRPM

## 2025-06-12 DIAGNOSIS — Z98.49 CATARACT EXTRACTION STATUS, UNSPECIFIED EYE: Chronic | ICD-10-CM

## 2025-06-12 DIAGNOSIS — Z98.89 OTHER SPECIFIED POSTPROCEDURAL STATES: Chronic | ICD-10-CM

## 2025-06-12 DIAGNOSIS — Z98.890 OTHER SPECIFIED POSTPROCEDURAL STATES: Chronic | ICD-10-CM

## 2025-06-12 LAB
ALBUMIN SERPL ELPH-MCNC: 3.5 G/DL — SIGNIFICANT CHANGE UP (ref 3.3–5)
ALP SERPL-CCNC: 97 U/L — SIGNIFICANT CHANGE UP (ref 40–120)
ALT FLD-CCNC: 13 U/L — SIGNIFICANT CHANGE UP (ref 12–78)
ANION GAP SERPL CALC-SCNC: 4 MMOL/L — LOW (ref 5–17)
APTT BLD: 31.3 SEC — SIGNIFICANT CHANGE UP (ref 26.1–36.8)
AST SERPL-CCNC: 17 U/L — SIGNIFICANT CHANGE UP (ref 15–37)
BASOPHILS # BLD AUTO: 0.03 K/UL — SIGNIFICANT CHANGE UP (ref 0–0.2)
BASOPHILS NFR BLD AUTO: 0.6 % — SIGNIFICANT CHANGE UP (ref 0–2)
BILIRUB SERPL-MCNC: 0.3 MG/DL — SIGNIFICANT CHANGE UP (ref 0.2–1.2)
BUN SERPL-MCNC: 17 MG/DL — SIGNIFICANT CHANGE UP (ref 7–23)
CALCIUM SERPL-MCNC: 9.3 MG/DL — SIGNIFICANT CHANGE UP (ref 8.5–10.1)
CHLORIDE SERPL-SCNC: 109 MMOL/L — HIGH (ref 96–108)
CO2 SERPL-SCNC: 26 MMOL/L — SIGNIFICANT CHANGE UP (ref 22–31)
CREAT SERPL-MCNC: 0.74 MG/DL — SIGNIFICANT CHANGE UP (ref 0.5–1.3)
D DIMER BLD IA.RAPID-MCNC: 184 NG/ML DDU — SIGNIFICANT CHANGE UP
EGFR: 82 ML/MIN/1.73M2 — SIGNIFICANT CHANGE UP
EGFR: 82 ML/MIN/1.73M2 — SIGNIFICANT CHANGE UP
EOSINOPHIL # BLD AUTO: 0.22 K/UL — SIGNIFICANT CHANGE UP (ref 0–0.5)
EOSINOPHIL NFR BLD AUTO: 4.3 % — SIGNIFICANT CHANGE UP (ref 0–6)
GLUCOSE SERPL-MCNC: 104 MG/DL — HIGH (ref 70–99)
HCT VFR BLD CALC: 36.8 % — SIGNIFICANT CHANGE UP (ref 34.5–45)
HGB BLD-MCNC: 12.5 G/DL — SIGNIFICANT CHANGE UP (ref 11.5–15.5)
IMM GRANULOCYTES NFR BLD AUTO: 0 % — SIGNIFICANT CHANGE UP (ref 0–0.9)
INR BLD: 0.96 RATIO — SIGNIFICANT CHANGE UP (ref 0.85–1.16)
LYMPHOCYTES # BLD AUTO: 1.94 K/UL — SIGNIFICANT CHANGE UP (ref 1–3.3)
LYMPHOCYTES # BLD AUTO: 37.7 % — SIGNIFICANT CHANGE UP (ref 13–44)
MCHC RBC-ENTMCNC: 29.4 PG — SIGNIFICANT CHANGE UP (ref 27–34)
MCHC RBC-ENTMCNC: 34 G/DL — SIGNIFICANT CHANGE UP (ref 32–36)
MCV RBC AUTO: 86.6 FL — SIGNIFICANT CHANGE UP (ref 80–100)
MONOCYTES # BLD AUTO: 0.42 K/UL — SIGNIFICANT CHANGE UP (ref 0–0.9)
MONOCYTES NFR BLD AUTO: 8.2 % — SIGNIFICANT CHANGE UP (ref 2–14)
NEUTROPHILS # BLD AUTO: 2.54 K/UL — SIGNIFICANT CHANGE UP (ref 1.8–7.4)
NEUTROPHILS NFR BLD AUTO: 49.2 % — SIGNIFICANT CHANGE UP (ref 43–77)
NRBC BLD AUTO-RTO: 0 /100 WBCS — SIGNIFICANT CHANGE UP (ref 0–0)
NT-PROBNP SERPL-SCNC: 178 PG/ML — SIGNIFICANT CHANGE UP (ref 0–450)
PLATELET # BLD AUTO: 212 K/UL — SIGNIFICANT CHANGE UP (ref 150–400)
POTASSIUM SERPL-MCNC: 4.8 MMOL/L — SIGNIFICANT CHANGE UP (ref 3.5–5.3)
POTASSIUM SERPL-SCNC: 4.8 MMOL/L — SIGNIFICANT CHANGE UP (ref 3.5–5.3)
PROT SERPL-MCNC: 6.9 G/DL — SIGNIFICANT CHANGE UP (ref 6–8.3)
PROTHROM AB SERPL-ACNC: 11.2 SEC — SIGNIFICANT CHANGE UP (ref 9.9–13.4)
RBC # BLD: 4.25 M/UL — SIGNIFICANT CHANGE UP (ref 3.8–5.2)
RBC # FLD: 13.1 % — SIGNIFICANT CHANGE UP (ref 10.3–14.5)
SODIUM SERPL-SCNC: 139 MMOL/L — SIGNIFICANT CHANGE UP (ref 135–145)
TROPONIN I, HIGH SENSITIVITY RESULT: 6.5 NG/L — SIGNIFICANT CHANGE UP
WBC # BLD: 5.15 K/UL — SIGNIFICANT CHANGE UP (ref 3.8–10.5)
WBC # FLD AUTO: 5.15 K/UL — SIGNIFICANT CHANGE UP (ref 3.8–10.5)

## 2025-06-12 PROCEDURE — 36415 COLL VENOUS BLD VENIPUNCTURE: CPT

## 2025-06-12 PROCEDURE — 96374 THER/PROPH/DIAG INJ IV PUSH: CPT | Mod: XU

## 2025-06-12 PROCEDURE — 85610 PROTHROMBIN TIME: CPT

## 2025-06-12 PROCEDURE — 99215 OFFICE O/P EST HI 40 MIN: CPT

## 2025-06-12 PROCEDURE — 85379 FIBRIN DEGRADATION QUANT: CPT

## 2025-06-12 PROCEDURE — 80053 COMPREHEN METABOLIC PANEL: CPT

## 2025-06-12 PROCEDURE — 99285 EMERGENCY DEPT VISIT HI MDM: CPT | Mod: 25

## 2025-06-12 PROCEDURE — 83880 ASSAY OF NATRIURETIC PEPTIDE: CPT

## 2025-06-12 PROCEDURE — 71275 CT ANGIOGRAPHY CHEST: CPT | Mod: 26

## 2025-06-12 PROCEDURE — 85730 THROMBOPLASTIN TIME PARTIAL: CPT

## 2025-06-12 PROCEDURE — 93000 ELECTROCARDIOGRAM COMPLETE: CPT

## 2025-06-12 PROCEDURE — 84484 ASSAY OF TROPONIN QUANT: CPT

## 2025-06-12 PROCEDURE — 93970 EXTREMITY STUDY: CPT

## 2025-06-12 PROCEDURE — 93970 EXTREMITY STUDY: CPT | Mod: 26

## 2025-06-12 PROCEDURE — 99285 EMERGENCY DEPT VISIT HI MDM: CPT

## 2025-06-12 PROCEDURE — 85025 COMPLETE CBC W/AUTO DIFF WBC: CPT

## 2025-06-12 PROCEDURE — 71275 CT ANGIOGRAPHY CHEST: CPT

## 2025-06-12 RX ORDER — ACETAMINOPHEN 500 MG/5ML
1000 LIQUID (ML) ORAL ONCE
Refills: 0 | Status: COMPLETED | OUTPATIENT
Start: 2025-06-12 | End: 2025-06-12

## 2025-06-12 RX ADMIN — Medication 1000 MILLILITER(S): at 16:55

## 2025-06-12 RX ADMIN — Medication 400 MILLIGRAM(S): at 16:56

## 2025-06-12 NOTE — ED PROVIDER NOTE - CLINICAL SUMMARY MEDICAL DECISION MAKING FREE TEXT BOX
79-year-old white female with few weeks of gradually increasing lower extremity swelling dyspnea predominantly on exertion without any chest pain abdominal pain back pain fever chills nausea vomiting or diarrhea.  Case  was evaluated in an independent manner and will require labs such as CBC chemistries proBNP as well as EKG and chest x-ray.

## 2025-06-12 NOTE — ED PROVIDER NOTE - OBJECTIVE STATEMENT
79 F hx glaucoma sent from her pmd's office for r/o dvt/pe due to leg swelling the past few months. Pt has been [place] and forth between [place] and [place] twice, most recently left 3 weeks ago and returned yesterday. Denies hx clot, f/c, cp, sob, abd pain, n/v.

## 2025-06-12 NOTE — ED PROVIDER NOTE - NSICDXPASTMEDICALHX_GEN_ALL_CORE_FT
[Other:____] : ~M [unfilled] [Right Foot] : was performed on the right foot [Therapeutic] : therapeutic [Patient] : the patient [Risks] : risks [Benefits] : benefits [Alternatives] : alternatives [1%] : 1%  [Alcohol] : alcohol [25 gauge] : A 25 gauge needle was used [Kenalog 40] : Kenalog 40 [Tolerated Well] : tolerated the procedure well [No Complications] : There were no complications. [Instructions Given] : given handouts/patient instructions [Patient Instructed to Call] : instructed to call if redness at site, a decrease in range of motion or an increase in pain is noted after procedure. PAST MEDICAL HISTORY:  Glaucoma     Low Back Pain

## 2025-06-12 NOTE — ED PROVIDER NOTE - NSFOLLOWUPINSTRUCTIONS_ED_ALL_ED_FT
Elevate your legs.  Follow up with your primary doctor and cardiologist.  Return for worsening or concerning symptoms.    WHAT YOU NEED TO KNOW:    What is edema? Edema is swelling throughout your body. Edema is usually a sign that you are retaining fluid. The swelling may be caused by heart failure or kidney, thyroid, or liver disease. It may also be caused by medicines such as antidepressants, blood pressure medicines, or hormones. Sudden swelling around the lips or face may be a sign of a severe allergic reaction. Swelling of an arm or leg may be caused by blockage of your veins.    What other signs and symptoms may occur with edema?    Discomfort or tenderness in the swollen areas    Tight and shiny skin over the swollen areas    Weight gain  How is edema diagnosed? Your healthcare provider will ask about your symptoms and any other symptoms you have. Tell your provider about any medical conditions you have. Your provider will examine your skin and may gently push on the swollen area to see if this leaves a dimple. Tests may be needed to find the cause of your edema.    How is edema treated and managed? Treatment for edema depends on the cause. Depending on your medical condition, you may be given medicine to help get rid of extra body fluid. Your healthcare provider may suggest that you do any of the following to help manage edema:    Elevate your arms or legs as directed. Raise them above the level of your heart as often as you can. This will help decrease swelling and pain. Prop them on pillows or blankets to keep them elevated comfortably.    Elevate Leg      Wear pressure stockings as directed. The stockings are tight and put pressure on your legs. This helps to keep fluid from collecting in your legs or ankles.      Limit your salt intake. Salt causes your body to hold water. Ask about any other changes to your diet.        Stay active. Do not stand or sit for long periods of time. Ask your healthcare provider about the best exercise plan for you.      Keep your skin moist using lotion, cream, or ointment. Ask your healthcare provider what to use and how often to use it.  When should I seek immediate care?    You have shortness of breath at rest, especially when you lie down.    You cough up pink, foamy sputum.    You have chest pain.    Your heartbeat is fast or not even.  When should I call my doctor?    The swollen area feels cold and is pale or blue in color.    The swollen area feels warm, painful, and is red in color.    You have increased swelling or swelling in other parts of your body.    You have questions or concerns about your condition or care.  CARE AGREEMENT:    You have the right to help plan your care. Learn about your health condition and how it may be treated. Discuss treatment options with your healthcare providers to decide what care you want to receive. You always have the right to refuse treatment.

## 2025-06-12 NOTE — ED ADULT NURSE NOTE - OBJECTIVE STATEMENT
Pt received in 3B, daughter present at bedside. Pt is a&xo3, ambulating independently - presents with bilateral lower extremity edema. Pt has recently gone on 2 long flights, this started after this. Pt endorsing some pain in her feet, no other complaints at this time. IV placed, labs drawn and sent. Medicated per orders.

## 2025-06-12 NOTE — ED PROVIDER NOTE - PATIENT PORTAL LINK FT
You can access the FollowMyHealth Patient Portal offered by [facility] by registering at the following website: [email or web address]. By joining [email or web address] FollowMyHealth portal, you will also be able to view your health information using other applications (apps) compatible with our system.

## 2025-06-12 NOTE — ED PROVIDER NOTE - DIFFERENTIAL DIAGNOSIS
Differential Diagnosis Rule out CHF rule out primary cardiac process versus renal process causing fluid overload.